# Patient Record
Sex: FEMALE | Race: WHITE | NOT HISPANIC OR LATINO | Employment: OTHER | URBAN - METROPOLITAN AREA
[De-identification: names, ages, dates, MRNs, and addresses within clinical notes are randomized per-mention and may not be internally consistent; named-entity substitution may affect disease eponyms.]

---

## 2017-03-27 ENCOUNTER — HISTORICAL (OUTPATIENT)
Dept: RADIOLOGY | Facility: HOSPITAL | Age: 72
End: 2017-03-27

## 2017-05-25 ENCOUNTER — HISTORICAL (OUTPATIENT)
Dept: RADIOLOGY | Facility: HOSPITAL | Age: 72
End: 2017-05-25

## 2017-06-12 ENCOUNTER — HISTORICAL (OUTPATIENT)
Dept: PREADMISSION TESTING | Facility: HOSPITAL | Age: 72
End: 2017-06-12

## 2017-06-12 ENCOUNTER — HISTORICAL (OUTPATIENT)
Dept: LAB | Facility: HOSPITAL | Age: 72
End: 2017-06-12

## 2017-06-12 LAB
ABS NEUT (OLG): 5.87 X10(3)/MCL (ref 2.1–9.2)
ALBUMIN SERPL-MCNC: 3.6 GM/DL (ref 3.4–5)
ALBUMIN/GLOB SERPL: 1.2 RATIO (ref 1.1–2)
ALP SERPL-CCNC: 54 UNIT/L (ref 38–126)
ALT SERPL-CCNC: 17 UNIT/L (ref 12–78)
APTT PPP: 28.6 SECOND(S) (ref 20.6–36)
AST SERPL-CCNC: 11 UNIT/L (ref 15–37)
BASOPHILS # BLD AUTO: 0.1 X10(3)/MCL (ref 0–0.2)
BASOPHILS NFR BLD AUTO: 1 %
BILIRUB SERPL-MCNC: 0.8 MG/DL (ref 0.2–1)
BILIRUBIN DIRECT+TOT PNL SERPL-MCNC: 0 MG/DL (ref 0–0.5)
BILIRUBIN DIRECT+TOT PNL SERPL-MCNC: 0.8 MG/DL (ref 0–0.8)
BUN SERPL-MCNC: 8 MG/DL (ref 7–18)
CALCIUM SERPL-MCNC: 8.5 MG/DL (ref 8.5–10.1)
CHLORIDE SERPL-SCNC: 110 MMOL/L (ref 98–107)
CO2 SERPL-SCNC: 25 MMOL/L (ref 21–32)
CREAT SERPL-MCNC: 0.77 MG/DL (ref 0.55–1.02)
EOSINOPHIL # BLD AUTO: 0.1 X10(3)/MCL (ref 0–0.9)
EOSINOPHIL NFR BLD AUTO: 2 %
ERYTHROCYTE [DISTWIDTH] IN BLOOD BY AUTOMATED COUNT: 14.8 % (ref 11.5–17)
GLOBULIN SER-MCNC: 2.9 GM/DL (ref 2.4–3.5)
GLUCOSE SERPL-MCNC: 84 MG/DL (ref 74–106)
HCT VFR BLD AUTO: 35.1 % (ref 37–47)
HGB BLD-MCNC: 11.2 GM/DL (ref 12–16)
INR PPP: 1.04 (ref 0–1.27)
LYMPHOCYTES # BLD AUTO: 1.1 X10(3)/MCL (ref 0.6–4.6)
LYMPHOCYTES NFR BLD AUTO: 14 %
MCH RBC QN AUTO: 29.4 PG (ref 27–31)
MCHC RBC AUTO-ENTMCNC: 31.9 GM/DL (ref 33–36)
MCV RBC AUTO: 92.1 FL (ref 80–94)
MONOCYTES # BLD AUTO: 0.5 X10(3)/MCL (ref 0.1–1.3)
MONOCYTES NFR BLD AUTO: 7 %
NEUTROPHILS # BLD AUTO: 5.87 X10(3)/MCL (ref 1.4–7.9)
NEUTROPHILS NFR BLD AUTO: 76 %
PLATELET # BLD AUTO: 226 X10(3)/MCL (ref 130–400)
PMV BLD AUTO: 11.8 FL (ref 9.4–12.4)
POTASSIUM SERPL-SCNC: 4.3 MMOL/L (ref 3.5–5.1)
PROT SERPL-MCNC: 6.5 GM/DL (ref 6.4–8.2)
PROTHROMBIN TIME: 13.4 SECOND(S) (ref 12.1–14.2)
RBC # BLD AUTO: 3.81 X10(6)/MCL (ref 4.2–5.4)
SODIUM SERPL-SCNC: 147 MMOL/L (ref 136–145)
WBC # SPEC AUTO: 7.7 X10(3)/MCL (ref 4.5–11.5)

## 2017-06-15 ENCOUNTER — HISTORICAL (OUTPATIENT)
Dept: LAB | Facility: HOSPITAL | Age: 72
End: 2017-06-15

## 2017-06-15 ENCOUNTER — HISTORICAL (OUTPATIENT)
Dept: SURGERY | Facility: HOSPITAL | Age: 72
End: 2017-06-15

## 2017-07-31 ENCOUNTER — HISTORICAL (OUTPATIENT)
Dept: RADIOLOGY | Facility: HOSPITAL | Age: 72
End: 2017-07-31

## 2017-08-03 ENCOUNTER — HISTORICAL (OUTPATIENT)
Dept: RADIOLOGY | Facility: HOSPITAL | Age: 72
End: 2017-08-03

## 2017-08-04 ENCOUNTER — HISTORICAL (OUTPATIENT)
Dept: ANESTHESIOLOGY | Facility: HOSPITAL | Age: 72
End: 2017-08-04

## 2018-05-29 ENCOUNTER — HISTORICAL (OUTPATIENT)
Dept: RADIOLOGY | Facility: HOSPITAL | Age: 73
End: 2018-05-29

## 2018-07-06 ENCOUNTER — HISTORICAL (OUTPATIENT)
Dept: RADIOLOGY | Facility: HOSPITAL | Age: 73
End: 2018-07-06

## 2019-07-01 ENCOUNTER — HISTORICAL (OUTPATIENT)
Dept: RADIOLOGY | Facility: HOSPITAL | Age: 74
End: 2019-07-01

## 2019-07-24 ENCOUNTER — HISTORICAL (OUTPATIENT)
Dept: RADIOLOGY | Facility: HOSPITAL | Age: 74
End: 2019-07-24

## 2020-09-01 ENCOUNTER — HISTORICAL (OUTPATIENT)
Dept: RADIOLOGY | Facility: HOSPITAL | Age: 75
End: 2020-09-01

## 2020-09-01 LAB — BCS RECOMMENDATION EXT: NORMAL

## 2021-05-05 ENCOUNTER — HISTORICAL (OUTPATIENT)
Dept: LAB | Facility: HOSPITAL | Age: 76
End: 2021-05-05

## 2021-05-05 LAB
ABS NEUT (OLG): 6.1 X10(3)/MCL (ref 1.5–6.9)
BUN SERPL-MCNC: 16 MG/DL (ref 9.8–20.1)
CALCIUM SERPL-MCNC: 9.7 MG/DL (ref 8.4–10.2)
CHLORIDE SERPL-SCNC: 108 MMOL/L (ref 98–107)
CO2 SERPL-SCNC: 23 MMOL/L (ref 23–31)
CREAT SERPL-MCNC: 1.07 MG/DL (ref 0.55–1.02)
CREAT/UREA NIT SERPL: 15
ERYTHROCYTE [DISTWIDTH] IN BLOOD BY AUTOMATED COUNT: 14 % (ref 11.5–17)
GLUCOSE SERPL-MCNC: 113 MG/DL (ref 82–115)
HCT VFR BLD AUTO: 39.2 % (ref 36–48)
HGB BLD-MCNC: 12.9 GM/DL (ref 12–16)
MCH RBC QN AUTO: 30 PG (ref 27–34)
MCHC RBC AUTO-ENTMCNC: 33 GM/DL (ref 31–36)
MCV RBC AUTO: 91 FL (ref 80–99)
PLATELET # BLD AUTO: 320 X10(3)/MCL (ref 140–400)
PMV BLD AUTO: 11.5 FL (ref 6.8–10)
POTASSIUM SERPL-SCNC: 4 MMOL/L (ref 3.5–5.1)
RBC # BLD AUTO: 4.32 X10(6)/MCL (ref 4.2–5.4)
SODIUM SERPL-SCNC: 141 MMOL/L (ref 136–145)
WBC # SPEC AUTO: 9.1 X10(3)/MCL (ref 4.5–11.5)

## 2021-05-11 ENCOUNTER — HOSPITAL ENCOUNTER (OUTPATIENT)
Dept: MEDSURG UNIT | Facility: HOSPITAL | Age: 76
End: 2021-05-12
Attending: INTERNAL MEDICINE | Admitting: INTERNAL MEDICINE

## 2021-05-12 LAB
ABS NEUT (OLG): 4.9 X10(3)/MCL (ref 2.1–9.2)
ALBUMIN SERPL-MCNC: 2.9 GM/DL (ref 3.4–4.8)
ALBUMIN/GLOB SERPL: 1 RATIO (ref 1.1–2)
ALP SERPL-CCNC: 53 UNIT/L (ref 40–150)
ALT SERPL-CCNC: 9 UNIT/L (ref 0–55)
AST SERPL-CCNC: 28 UNIT/L (ref 5–34)
BASOPHILS # BLD AUTO: 0.1 X10(3)/MCL (ref 0–0.2)
BASOPHILS NFR BLD AUTO: 1 %
BILIRUB SERPL-MCNC: 0.5 MG/DL
BILIRUBIN DIRECT+TOT PNL SERPL-MCNC: 0.2 MG/DL (ref 0–0.5)
BILIRUBIN DIRECT+TOT PNL SERPL-MCNC: 0.3 MG/DL (ref 0–0.8)
BUN SERPL-MCNC: 10.6 MG/DL (ref 9.8–20.1)
CALCIUM SERPL-MCNC: 8.5 MG/DL (ref 8.4–10.2)
CHLORIDE SERPL-SCNC: 108 MMOL/L (ref 98–107)
CO2 SERPL-SCNC: 23 MMOL/L (ref 23–31)
CREAT SERPL-MCNC: 0.82 MG/DL (ref 0.55–1.02)
EOSINOPHIL # BLD AUTO: 0.1 X10(3)/MCL (ref 0–0.9)
EOSINOPHIL NFR BLD AUTO: 2 %
ERYTHROCYTE [DISTWIDTH] IN BLOOD BY AUTOMATED COUNT: 14.7 % (ref 11.5–17)
GLOBULIN SER-MCNC: 2.8 GM/DL (ref 2.4–3.5)
GLUCOSE SERPL-MCNC: 96 MG/DL (ref 82–115)
HCT VFR BLD AUTO: 34.6 % (ref 37–47)
HGB BLD-MCNC: 11.2 GM/DL (ref 12–16)
LYMPHOCYTES # BLD AUTO: 1.7 X10(3)/MCL (ref 0.6–4.6)
LYMPHOCYTES NFR BLD AUTO: 23 %
MCH RBC QN AUTO: 30.4 PG (ref 27–31)
MCHC RBC AUTO-ENTMCNC: 32.4 GM/DL (ref 33–36)
MCV RBC AUTO: 93.8 FL (ref 80–94)
MONOCYTES # BLD AUTO: 0.6 X10(3)/MCL (ref 0.1–1.3)
MONOCYTES NFR BLD AUTO: 8 %
NEUTROPHILS # BLD AUTO: 4.9 X10(3)/MCL (ref 2.1–9.2)
NEUTROPHILS NFR BLD AUTO: 67 %
PLATELET # BLD AUTO: 236 X10(3)/MCL (ref 130–400)
PMV BLD AUTO: 11.5 FL (ref 9.4–12.4)
POTASSIUM SERPL-SCNC: 4.5 MMOL/L (ref 3.5–5.1)
PROT SERPL-MCNC: 5.7 GM/DL (ref 5.8–7.6)
RBC # BLD AUTO: 3.69 X10(6)/MCL (ref 4.2–5.4)
SODIUM SERPL-SCNC: 140 MMOL/L (ref 136–145)
WBC # SPEC AUTO: 7.3 X10(3)/MCL (ref 4.5–11.5)

## 2021-09-29 ENCOUNTER — HISTORICAL (OUTPATIENT)
Dept: RADIOLOGY | Facility: HOSPITAL | Age: 76
End: 2021-09-29

## 2021-10-05 ENCOUNTER — HISTORICAL (OUTPATIENT)
Dept: RADIOLOGY | Facility: HOSPITAL | Age: 76
End: 2021-10-05

## 2022-04-11 ENCOUNTER — HISTORICAL (OUTPATIENT)
Dept: ADMINISTRATIVE | Facility: HOSPITAL | Age: 77
End: 2022-04-11
Payer: MEDICARE

## 2022-04-27 VITALS
HEIGHT: 66 IN | BODY MASS INDEX: 36.64 KG/M2 | SYSTOLIC BLOOD PRESSURE: 138 MMHG | WEIGHT: 228 LBS | OXYGEN SATURATION: 97 % | DIASTOLIC BLOOD PRESSURE: 88 MMHG

## 2022-04-29 NOTE — OP NOTE
DATE OF SURGERY:    06/15/2017    SURGEON:  Breezy Stubbs MD    PREOPERATIVE DIAGNOSIS:  Chronic recurrent pansinusitis, intranasal and intrasinus polyposis, nasal septal deviation, turbinate hypertrophy.    POSTOPERATIVE DIAGNOSIS:  Chronic recurrent pansinusitis, intranasal and intrasinus polyposis, nasal septal deviation, turbinate hypertrophy    PROCEDURES:  Revision endoscopic sinus surgery, placement of right drug-eluting stent in the frontal ethmoid recess and cultures.    PROCEDURE IN DETAIL:  With proper consent and information, the patient was brought to the operating room and placed on the operating table in the supine position.  After satisfactory endotracheal intubation and general anesthesia, the patient was placed asleep.  The nose was packed with 200 mg of cocaine, regionally anesthetized with 1 mL of 2% Xylocaine with epinephrine.  Bilateral posterior ethmoidectomies were done endoscopically the frontal ethmoid recess.  The right frontal sinus, as well as the left was severely stenotic.  This was opened with upbiting cutting forceps.  The right was much more involved with the left maxillary antrostomy revisions were done and the Propel stent was placed in the right frontal ethmoid recess.  The patient tolerated the procedure well.  She was transferred back to the recovery room awake, alert and responsive.        ______________________________  MD MARIAELENA Patel/CD  DD:  06/19/2017  Time:  01:43PM  DT:  06/20/2017  Time:  08:17AM  Job #:  [id number]

## 2022-06-28 ENCOUNTER — HOSPITAL ENCOUNTER (EMERGENCY)
Facility: HOSPITAL | Age: 77
Discharge: STILL ADMITTED OR ADMINISTRATIVE | End: 2022-06-29
Attending: STUDENT IN AN ORGANIZED HEALTH CARE EDUCATION/TRAINING PROGRAM
Payer: MEDICARE

## 2022-06-28 DIAGNOSIS — R06.02 SHORTNESS OF BREATH: ICD-10-CM

## 2022-06-28 DIAGNOSIS — R09.02 HYPOXIA: ICD-10-CM

## 2022-06-28 DIAGNOSIS — J45.901 EXACERBATION OF ASTHMA, UNSPECIFIED ASTHMA SEVERITY, UNSPECIFIED WHETHER PERSISTENT: Primary | ICD-10-CM

## 2022-06-28 LAB
ALBUMIN SERPL-MCNC: 3.1 GM/DL (ref 3.4–4.8)
ALBUMIN/GLOB SERPL: 0.9 RATIO (ref 1.1–2)
ALP SERPL-CCNC: 37 UNIT/L (ref 40–150)
ALT SERPL-CCNC: 7 UNIT/L (ref 0–55)
AST SERPL-CCNC: 12 UNIT/L (ref 5–34)
BASOPHILS # BLD AUTO: 0.12 X10(3)/MCL (ref 0–0.2)
BASOPHILS NFR BLD AUTO: 1.4 %
BILIRUBIN DIRECT+TOT PNL SERPL-MCNC: 0.4 MG/DL
BUN SERPL-MCNC: 12 MG/DL (ref 9.8–20.1)
CALCIUM SERPL-MCNC: 9.5 MG/DL (ref 8.4–10.2)
CHLORIDE SERPL-SCNC: 105 MMOL/L (ref 98–107)
CO2 SERPL-SCNC: 23 MMOL/L (ref 23–31)
CREAT SERPL-MCNC: 0.87 MG/DL (ref 0.55–1.02)
EOSINOPHIL # BLD AUTO: 0.36 X10(3)/MCL (ref 0–0.9)
EOSINOPHIL NFR BLD AUTO: 4.3 %
ERYTHROCYTE [DISTWIDTH] IN BLOOD BY AUTOMATED COUNT: 14.4 % (ref 11.5–17)
FLUAV AG UPPER RESP QL IA.RAPID: NOT DETECTED
FLUBV AG UPPER RESP QL IA.RAPID: NOT DETECTED
GLOBULIN SER-MCNC: 3.6 GM/DL (ref 2.4–3.5)
GLUCOSE SERPL-MCNC: 113 MG/DL (ref 82–115)
HCT VFR BLD AUTO: 33.8 % (ref 37–47)
HGB BLD-MCNC: 11 GM/DL (ref 12–16)
IMM GRANULOCYTES # BLD AUTO: 0.03 X10(3)/MCL (ref 0–0.02)
IMM GRANULOCYTES NFR BLD AUTO: 0.4 % (ref 0–0.43)
LYMPHOCYTES # BLD AUTO: 1.73 X10(3)/MCL (ref 0.6–4.6)
LYMPHOCYTES NFR BLD AUTO: 20.5 %
MCH RBC QN AUTO: 30.1 PG (ref 27–31)
MCHC RBC AUTO-ENTMCNC: 32.5 MG/DL (ref 33–36)
MCV RBC AUTO: 92.6 FL (ref 80–94)
MONOCYTES # BLD AUTO: 0.54 X10(3)/MCL (ref 0.1–1.3)
MONOCYTES NFR BLD AUTO: 6.4 %
NEUTROPHILS # BLD AUTO: 5.7 X10(3)/MCL (ref 2.1–9.2)
NEUTROPHILS NFR BLD AUTO: 67 %
PLATELET # BLD AUTO: 255 X10(3)/MCL (ref 130–400)
PMV BLD AUTO: 10.6 FL (ref 9.4–12.4)
POTASSIUM SERPL-SCNC: 4.3 MMOL/L (ref 3.5–5.1)
PROT SERPL-MCNC: 6.7 GM/DL (ref 5.8–7.6)
RBC # BLD AUTO: 3.65 X10(6)/MCL (ref 4.2–5.4)
SARS-COV-2 RNA RESP QL NAA+PROBE: NOT DETECTED
SODIUM SERPL-SCNC: 140 MMOL/L (ref 136–145)
TROPONIN I SERPL-MCNC: <0.01 NG/ML (ref 0–0.04)
WBC # SPEC AUTO: 8.4 X10(3)/MCL (ref 4.5–11.5)

## 2022-06-28 PROCEDURE — 94640 AIRWAY INHALATION TREATMENT: CPT | Mod: XB

## 2022-06-28 PROCEDURE — 93005 ELECTROCARDIOGRAM TRACING: CPT

## 2022-06-28 PROCEDURE — 63600175 PHARM REV CODE 636 W HCPCS: Performed by: STUDENT IN AN ORGANIZED HEALTH CARE EDUCATION/TRAINING PROGRAM

## 2022-06-28 PROCEDURE — 84484 ASSAY OF TROPONIN QUANT: CPT | Performed by: STUDENT IN AN ORGANIZED HEALTH CARE EDUCATION/TRAINING PROGRAM

## 2022-06-28 PROCEDURE — 99285 EMERGENCY DEPT VISIT HI MDM: CPT | Mod: 25

## 2022-06-28 PROCEDURE — 25000242 PHARM REV CODE 250 ALT 637 W/ HCPCS: Performed by: STUDENT IN AN ORGANIZED HEALTH CARE EDUCATION/TRAINING PROGRAM

## 2022-06-28 PROCEDURE — 36415 COLL VENOUS BLD VENIPUNCTURE: CPT | Performed by: STUDENT IN AN ORGANIZED HEALTH CARE EDUCATION/TRAINING PROGRAM

## 2022-06-28 PROCEDURE — 87636 SARSCOV2 & INF A&B AMP PRB: CPT | Performed by: STUDENT IN AN ORGANIZED HEALTH CARE EDUCATION/TRAINING PROGRAM

## 2022-06-28 PROCEDURE — 94761 N-INVAS EAR/PLS OXIMETRY MLT: CPT

## 2022-06-28 PROCEDURE — 80053 COMPREHEN METABOLIC PANEL: CPT | Performed by: STUDENT IN AN ORGANIZED HEALTH CARE EDUCATION/TRAINING PROGRAM

## 2022-06-28 PROCEDURE — 85025 COMPLETE CBC W/AUTO DIFF WBC: CPT | Performed by: STUDENT IN AN ORGANIZED HEALTH CARE EDUCATION/TRAINING PROGRAM

## 2022-06-28 RX ORDER — IPRATROPIUM BROMIDE AND ALBUTEROL SULFATE 2.5; .5 MG/3ML; MG/3ML
3 SOLUTION RESPIRATORY (INHALATION)
Status: COMPLETED | OUTPATIENT
Start: 2022-06-28 | End: 2022-06-28

## 2022-06-28 RX ORDER — PREDNISONE 20 MG/1
40 TABLET ORAL
Status: COMPLETED | OUTPATIENT
Start: 2022-06-28 | End: 2022-06-28

## 2022-06-28 RX ORDER — IPRATROPIUM BROMIDE AND ALBUTEROL SULFATE 2.5; .5 MG/3ML; MG/3ML
3 SOLUTION RESPIRATORY (INHALATION) EVERY 4 HOURS
Status: DISCONTINUED | OUTPATIENT
Start: 2022-06-28 | End: 2022-06-29 | Stop reason: HOSPADM

## 2022-06-28 RX ORDER — BUDESONIDE 0.5 MG/2ML
0.5 INHALANT ORAL EVERY 12 HOURS
Status: DISCONTINUED | OUTPATIENT
Start: 2022-06-28 | End: 2022-06-29 | Stop reason: HOSPADM

## 2022-06-28 RX ADMIN — PREDNISONE 40 MG: 20 TABLET ORAL at 09:06

## 2022-06-28 RX ADMIN — IPRATROPIUM BROMIDE AND ALBUTEROL SULFATE 3 ML: 2.5; .5 SOLUTION RESPIRATORY (INHALATION) at 10:06

## 2022-06-28 RX ADMIN — IPRATROPIUM BROMIDE AND ALBUTEROL SULFATE 3 ML: 2.5; .5 SOLUTION RESPIRATORY (INHALATION) at 11:06

## 2022-06-28 RX ADMIN — IPRATROPIUM BROMIDE AND ALBUTEROL SULFATE 3 ML: 2.5; .5 SOLUTION RESPIRATORY (INHALATION) at 09:06

## 2022-06-28 RX ADMIN — IPRATROPIUM BROMIDE AND ALBUTEROL SULFATE 3 ML: 2.5; .5 SOLUTION RESPIRATORY (INHALATION) at 08:06

## 2022-06-28 RX ADMIN — BUDESONIDE INHALATION 0.5 MG: 0.5 SUSPENSION RESPIRATORY (INHALATION) at 11:06

## 2022-06-29 ENCOUNTER — HOSPITAL ENCOUNTER (INPATIENT)
Facility: HOSPITAL | Age: 77
LOS: 1 days | Discharge: HOME HEALTH | DRG: 203 | End: 2022-07-01
Attending: STUDENT IN AN ORGANIZED HEALTH CARE EDUCATION/TRAINING PROGRAM | Admitting: INTERNAL MEDICINE
Payer: MEDICARE

## 2022-06-29 VITALS
SYSTOLIC BLOOD PRESSURE: 124 MMHG | RESPIRATION RATE: 19 BRPM | HEART RATE: 106 BPM | TEMPERATURE: 98 F | BODY MASS INDEX: 32.78 KG/M2 | HEIGHT: 66 IN | DIASTOLIC BLOOD PRESSURE: 68 MMHG | WEIGHT: 204 LBS | OXYGEN SATURATION: 95 %

## 2022-06-29 DIAGNOSIS — J45.41 MODERATE PERSISTENT ASTHMA WITH EXACERBATION: ICD-10-CM

## 2022-06-29 DIAGNOSIS — R09.02 HYPOXIA: Primary | ICD-10-CM

## 2022-06-29 DIAGNOSIS — J45.901 ASTHMA EXACERBATION: ICD-10-CM

## 2022-06-29 LAB
BASOPHILS # BLD AUTO: 0.04 X10(3)/MCL (ref 0–0.2)
BASOPHILS NFR BLD AUTO: 0.3 %
EOSINOPHIL # BLD AUTO: 0.02 X10(3)/MCL (ref 0–0.9)
EOSINOPHIL NFR BLD AUTO: 0.2 %
ERYTHROCYTE [DISTWIDTH] IN BLOOD BY AUTOMATED COUNT: 14.7 % (ref 11.5–17)
EST. AVERAGE GLUCOSE BLD GHB EST-MCNC: 99.7 MG/DL
HBA1C MFR BLD: 5.1 %
HCT VFR BLD AUTO: 35.2 % (ref 37–47)
HGB BLD-MCNC: 11.4 GM/DL (ref 12–16)
IMM GRANULOCYTES # BLD AUTO: 0.05 X10(3)/MCL (ref 0–0.02)
IMM GRANULOCYTES NFR BLD AUTO: 0.4 % (ref 0–0.43)
LYMPHOCYTES # BLD AUTO: 1.76 X10(3)/MCL (ref 0.6–4.6)
LYMPHOCYTES NFR BLD AUTO: 14.1 %
MCH RBC QN AUTO: 29.4 PG (ref 27–31)
MCHC RBC AUTO-ENTMCNC: 32.4 MG/DL (ref 33–36)
MCV RBC AUTO: 90.7 FL (ref 80–94)
MONOCYTES # BLD AUTO: 0.7 X10(3)/MCL (ref 0.1–1.3)
MONOCYTES NFR BLD AUTO: 5.6 %
NEUTROPHILS # BLD AUTO: 9.9 X10(3)/MCL (ref 2.1–9.2)
NEUTROPHILS NFR BLD AUTO: 79.4 %
NRBC BLD AUTO-RTO: 0 %
PLATELET # BLD AUTO: 271 X10(3)/MCL (ref 130–400)
PMV BLD AUTO: 11.2 FL (ref 9.4–12.4)
RBC # BLD AUTO: 3.88 X10(6)/MCL (ref 4.2–5.4)
WBC # SPEC AUTO: 12.4 X10(3)/MCL (ref 4.5–11.5)

## 2022-06-29 PROCEDURE — 83036 HEMOGLOBIN GLYCOSYLATED A1C: CPT | Performed by: INTERNAL MEDICINE

## 2022-06-29 PROCEDURE — 94761 N-INVAS EAR/PLS OXIMETRY MLT: CPT

## 2022-06-29 PROCEDURE — 97161 PT EVAL LOW COMPLEX 20 MIN: CPT

## 2022-06-29 PROCEDURE — 94640 AIRWAY INHALATION TREATMENT: CPT | Mod: XB

## 2022-06-29 PROCEDURE — 99285 EMERGENCY DEPT VISIT HI MDM: CPT | Mod: 25

## 2022-06-29 PROCEDURE — 25000242 PHARM REV CODE 250 ALT 637 W/ HCPCS: Performed by: STUDENT IN AN ORGANIZED HEALTH CARE EDUCATION/TRAINING PROGRAM

## 2022-06-29 PROCEDURE — 63600175 PHARM REV CODE 636 W HCPCS: Performed by: INTERNAL MEDICINE

## 2022-06-29 PROCEDURE — G0378 HOSPITAL OBSERVATION PER HR: HCPCS

## 2022-06-29 PROCEDURE — 85025 COMPLETE CBC W/AUTO DIFF WBC: CPT | Performed by: INTERNAL MEDICINE

## 2022-06-29 PROCEDURE — 25000242 PHARM REV CODE 250 ALT 637 W/ HCPCS: Performed by: INTERNAL MEDICINE

## 2022-06-29 PROCEDURE — 99900031 HC PATIENT EDUCATION (STAT)

## 2022-06-29 PROCEDURE — 25000003 PHARM REV CODE 250: Performed by: INTERNAL MEDICINE

## 2022-06-29 PROCEDURE — 94760 N-INVAS EAR/PLS OXIMETRY 1: CPT

## 2022-06-29 PROCEDURE — 36415 COLL VENOUS BLD VENIPUNCTURE: CPT | Performed by: INTERNAL MEDICINE

## 2022-06-29 RX ORDER — PREDNISONE 20 MG/1
40 TABLET ORAL DAILY
Status: DISCONTINUED | OUTPATIENT
Start: 2022-06-29 | End: 2022-06-29

## 2022-06-29 RX ORDER — ATORVASTATIN CALCIUM 40 MG/1
40 TABLET, FILM COATED ORAL DAILY
Status: DISCONTINUED | OUTPATIENT
Start: 2022-06-29 | End: 2022-07-01 | Stop reason: HOSPADM

## 2022-06-29 RX ORDER — ISOSORBIDE MONONITRATE 60 MG/1
60 TABLET, EXTENDED RELEASE ORAL DAILY
COMMUNITY

## 2022-06-29 RX ORDER — LEVOTHYROXINE SODIUM 88 UG/1
88 TABLET ORAL
Status: ON HOLD | COMMUNITY
End: 2022-07-01 | Stop reason: HOSPADM

## 2022-06-29 RX ORDER — CIPROFLOXACIN AND DEXAMETHASONE 3; 1 MG/ML; MG/ML
4 SUSPENSION/ DROPS AURICULAR (OTIC) 2 TIMES DAILY
Status: DISCONTINUED | OUTPATIENT
Start: 2022-06-29 | End: 2022-07-01 | Stop reason: HOSPADM

## 2022-06-29 RX ORDER — GUAIFENESIN/DEXTROMETHORPHAN 100-10MG/5
10 SYRUP ORAL EVERY 6 HOURS PRN
Status: DISCONTINUED | OUTPATIENT
Start: 2022-06-29 | End: 2022-07-01 | Stop reason: HOSPADM

## 2022-06-29 RX ORDER — DULOXETIN HYDROCHLORIDE 60 MG/1
60 CAPSULE, DELAYED RELEASE ORAL DAILY
COMMUNITY
Start: 2022-04-04

## 2022-06-29 RX ORDER — MONTELUKAST SODIUM 10 MG/1
10 TABLET ORAL NIGHTLY
Status: DISCONTINUED | OUTPATIENT
Start: 2022-06-29 | End: 2022-06-29

## 2022-06-29 RX ORDER — PROMETHAZINE HYDROCHLORIDE AND CODEINE PHOSPHATE 6.25; 1 MG/5ML; MG/5ML
7.5 SOLUTION ORAL EVERY 8 HOURS PRN
Status: DISCONTINUED | OUTPATIENT
Start: 2022-06-29 | End: 2022-06-29

## 2022-06-29 RX ORDER — DOXYCYCLINE 100 MG/1
100 CAPSULE ORAL
Status: ON HOLD | COMMUNITY
End: 2022-07-01 | Stop reason: HOSPADM

## 2022-06-29 RX ORDER — SODIUM CHLORIDE 0.9 % (FLUSH) 0.9 %
10 SYRINGE (ML) INJECTION
Status: DISCONTINUED | OUTPATIENT
Start: 2022-06-29 | End: 2022-07-01 | Stop reason: HOSPADM

## 2022-06-29 RX ORDER — ASPIRIN 81 MG/1
81 TABLET ORAL DAILY
Status: DISCONTINUED | OUTPATIENT
Start: 2022-06-29 | End: 2022-07-01 | Stop reason: HOSPADM

## 2022-06-29 RX ORDER — FLUCONAZOLE 2 MG/ML
200 INJECTION, SOLUTION INTRAVENOUS
Status: DISCONTINUED | OUTPATIENT
Start: 2022-06-29 | End: 2022-07-01 | Stop reason: HOSPADM

## 2022-06-29 RX ORDER — TALC
6 POWDER (GRAM) TOPICAL NIGHTLY PRN
Status: DISCONTINUED | OUTPATIENT
Start: 2022-06-29 | End: 2022-07-01 | Stop reason: HOSPADM

## 2022-06-29 RX ORDER — MONTELUKAST SODIUM 10 MG/1
10 TABLET ORAL NIGHTLY
COMMUNITY

## 2022-06-29 RX ORDER — BUDESONIDE 0.5 MG/2ML
0.5 INHALANT ORAL DAILY
Status: DISCONTINUED | OUTPATIENT
Start: 2022-06-29 | End: 2022-07-01 | Stop reason: HOSPADM

## 2022-06-29 RX ORDER — NITROGLYCERIN 0.4 MG/1
0.4 TABLET SUBLINGUAL
COMMUNITY
Start: 2021-10-12

## 2022-06-29 RX ORDER — AMOXICILLIN 250 MG
1 CAPSULE ORAL 2 TIMES DAILY
Status: DISCONTINUED | OUTPATIENT
Start: 2022-06-29 | End: 2022-07-01 | Stop reason: HOSPADM

## 2022-06-29 RX ORDER — RIVAROXABAN 2.5 MG/1
TABLET, FILM COATED ORAL 2 TIMES DAILY WITH MEALS
Status: ON HOLD | COMMUNITY
End: 2022-07-01 | Stop reason: HOSPADM

## 2022-06-29 RX ORDER — DOXYLAMINE SUCCINATE 25 MG
TABLET ORAL 2 TIMES DAILY
Status: DISCONTINUED | OUTPATIENT
Start: 2022-06-29 | End: 2022-07-01 | Stop reason: HOSPADM

## 2022-06-29 RX ORDER — DOXYCYCLINE HYCLATE 100 MG
100 TABLET ORAL EVERY 12 HOURS
Status: DISCONTINUED | OUTPATIENT
Start: 2022-06-29 | End: 2022-06-29

## 2022-06-29 RX ORDER — GUAIFENESIN 600 MG/1
1200 TABLET, EXTENDED RELEASE ORAL 2 TIMES DAILY
Status: DISCONTINUED | OUTPATIENT
Start: 2022-06-29 | End: 2022-07-01 | Stop reason: HOSPADM

## 2022-06-29 RX ORDER — MONTELUKAST SODIUM 10 MG/1
10 TABLET ORAL NIGHTLY
Status: DISCONTINUED | OUTPATIENT
Start: 2022-06-29 | End: 2022-07-01 | Stop reason: HOSPADM

## 2022-06-29 RX ORDER — FAMOTIDINE 20 MG/1
20 TABLET, FILM COATED ORAL DAILY
Status: DISCONTINUED | OUTPATIENT
Start: 2022-06-29 | End: 2022-07-01 | Stop reason: HOSPADM

## 2022-06-29 RX ORDER — SODIUM CHLORIDE 0.9 % (FLUSH) 0.9 %
10 SYRINGE (ML) INJECTION
Status: DISCONTINUED | OUTPATIENT
Start: 2022-06-29 | End: 2022-06-29

## 2022-06-29 RX ORDER — BENZONATATE 100 MG/1
100 CAPSULE ORAL 3 TIMES DAILY PRN
Status: DISCONTINUED | OUTPATIENT
Start: 2022-06-29 | End: 2022-07-01 | Stop reason: HOSPADM

## 2022-06-29 RX ORDER — ACETAMINOPHEN 325 MG/1
650 TABLET ORAL EVERY 4 HOURS PRN
Status: DISCONTINUED | OUTPATIENT
Start: 2022-06-29 | End: 2022-07-01 | Stop reason: HOSPADM

## 2022-06-29 RX ORDER — FLUTICASONE PROPIONATE 50 MCG
SPRAY, SUSPENSION (ML) NASAL
COMMUNITY

## 2022-06-29 RX ORDER — FLUTICASONE FUROATE AND VILANTEROL 200; 25 UG/1; UG/1
1 POWDER RESPIRATORY (INHALATION)
COMMUNITY
Start: 2021-10-12 | End: 2022-06-29 | Stop reason: CLARIF

## 2022-06-29 RX ORDER — IPRATROPIUM BROMIDE AND ALBUTEROL SULFATE 2.5; .5 MG/3ML; MG/3ML
3 SOLUTION RESPIRATORY (INHALATION) EVERY 6 HOURS PRN
Status: DISCONTINUED | OUTPATIENT
Start: 2022-06-29 | End: 2022-07-01 | Stop reason: HOSPADM

## 2022-06-29 RX ORDER — ALBUTEROL SULFATE 0.83 MG/ML
2.5 SOLUTION RESPIRATORY (INHALATION) EVERY 6 HOURS PRN
COMMUNITY

## 2022-06-29 RX ORDER — RIVAROXABAN 2.5 MG/1
1 TABLET, FILM COATED ORAL 2 TIMES DAILY
COMMUNITY
Start: 2022-06-06

## 2022-06-29 RX ORDER — DULOXETIN HYDROCHLORIDE 60 MG/1
60 CAPSULE, DELAYED RELEASE ORAL DAILY
Status: DISCONTINUED | OUTPATIENT
Start: 2022-06-29 | End: 2022-07-01 | Stop reason: HOSPADM

## 2022-06-29 RX ORDER — FLUTICASONE PROPIONATE 50 MCG
1 SPRAY, SUSPENSION (ML) NASAL DAILY
Status: DISCONTINUED | OUTPATIENT
Start: 2022-06-29 | End: 2022-07-01 | Stop reason: HOSPADM

## 2022-06-29 RX ORDER — DIAZEPAM 10 MG/2ML
5 INJECTION INTRAMUSCULAR EVERY 6 HOURS PRN
Status: DISCONTINUED | OUTPATIENT
Start: 2022-06-29 | End: 2022-07-01 | Stop reason: HOSPADM

## 2022-06-29 RX ORDER — AZELASTINE 1 MG/ML
SPRAY, METERED NASAL
COMMUNITY

## 2022-06-29 RX ORDER — METOPROLOL SUCCINATE 25 MG/1
25 TABLET, EXTENDED RELEASE ORAL DAILY
COMMUNITY

## 2022-06-29 RX ORDER — METOPROLOL SUCCINATE 25 MG/1
25 TABLET, EXTENDED RELEASE ORAL DAILY
Status: DISCONTINUED | OUTPATIENT
Start: 2022-06-29 | End: 2022-07-01 | Stop reason: HOSPADM

## 2022-06-29 RX ORDER — ALBUTEROL SULFATE 0.83 MG/ML
2.5 SOLUTION RESPIRATORY (INHALATION) EVERY 4 HOURS
Status: DISCONTINUED | OUTPATIENT
Start: 2022-06-29 | End: 2022-06-29

## 2022-06-29 RX ORDER — LEVOTHYROXINE SODIUM 88 UG/1
88 TABLET ORAL
Status: DISCONTINUED | OUTPATIENT
Start: 2022-06-29 | End: 2022-07-01 | Stop reason: HOSPADM

## 2022-06-29 RX ORDER — CETIRIZINE HYDROCHLORIDE 10 MG/1
10 TABLET ORAL DAILY
COMMUNITY

## 2022-06-29 RX ORDER — LEVOTHYROXINE SODIUM 125 UG/1
TABLET ORAL
Status: ON HOLD | COMMUNITY
End: 2022-06-29 | Stop reason: CLARIF

## 2022-06-29 RX ORDER — ISOSORBIDE MONONITRATE 30 MG/1
60 TABLET, EXTENDED RELEASE ORAL DAILY
Status: DISCONTINUED | OUTPATIENT
Start: 2022-06-29 | End: 2022-07-01 | Stop reason: HOSPADM

## 2022-06-29 RX ORDER — BECLOMETHASONE DIPROPIONATE MONOHYDRATE 42 UG/1
2 SPRAY, SUSPENSION NASAL
COMMUNITY
Start: 2022-01-31

## 2022-06-29 RX ORDER — ONDANSETRON 2 MG/ML
4 INJECTION INTRAMUSCULAR; INTRAVENOUS EVERY 8 HOURS PRN
Status: DISCONTINUED | OUTPATIENT
Start: 2022-06-29 | End: 2022-07-01 | Stop reason: HOSPADM

## 2022-06-29 RX ORDER — ROSUVASTATIN CALCIUM 20 MG/1
20 TABLET, COATED ORAL DAILY
COMMUNITY

## 2022-06-29 RX ADMIN — SENNOSIDES AND DOCUSATE SODIUM 1 TABLET: 50; 8.6 TABLET ORAL at 11:06

## 2022-06-29 RX ADMIN — MELATONIN TAB 3 MG 6 MG: 3 TAB at 11:06

## 2022-06-29 RX ADMIN — GUAIFENESIN 1200 MG: 600 TABLET, EXTENDED RELEASE ORAL at 04:06

## 2022-06-29 RX ADMIN — CEFEPIME 1 G: 1 INJECTION, POWDER, FOR SOLUTION INTRAMUSCULAR; INTRAVENOUS at 07:06

## 2022-06-29 RX ADMIN — METHYLPREDNISOLONE SODIUM SUCCINATE 60 MG: 40 INJECTION, POWDER, FOR SOLUTION INTRAMUSCULAR; INTRAVENOUS at 11:06

## 2022-06-29 RX ADMIN — FLUTICASONE PROPIONATE 50 MCG: 50 SPRAY, METERED NASAL at 10:06

## 2022-06-29 RX ADMIN — METOPROLOL SUCCINATE 25 MG: 25 TABLET, EXTENDED RELEASE ORAL at 11:06

## 2022-06-29 RX ADMIN — BUDESONIDE 0.5 MG: 0.5 INHALANT ORAL at 12:06

## 2022-06-29 RX ADMIN — MONTELUKAST SODIUM 10 MG: 10 TABLET, COATED ORAL at 08:06

## 2022-06-29 RX ADMIN — MICONAZOLE NITRATE: 20 CREAM TOPICAL at 03:06

## 2022-06-29 RX ADMIN — IPRATROPIUM BROMIDE AND ALBUTEROL SULFATE 3 ML: 2.5; .5 SOLUTION RESPIRATORY (INHALATION) at 12:06

## 2022-06-29 RX ADMIN — CIPROFLOXACIN AND DEXAMETHASONE 4 DROP: 3; 1 SUSPENSION/ DROPS AURICULAR (OTIC) at 03:06

## 2022-06-29 RX ADMIN — GUAIFENESIN 1200 MG: 600 TABLET, EXTENDED RELEASE ORAL at 08:06

## 2022-06-29 RX ADMIN — DULOXETINE HYDROCHLORIDE 60 MG: 60 CAPSULE, DELAYED RELEASE ORAL at 10:06

## 2022-06-29 RX ADMIN — FAMOTIDINE 20 MG: 20 TABLET ORAL at 11:06

## 2022-06-29 RX ADMIN — ATORVASTATIN CALCIUM 40 MG: 40 TABLET, FILM COATED ORAL at 11:06

## 2022-06-29 RX ADMIN — MICONAZOLE NITRATE: 20 CREAM TOPICAL at 08:06

## 2022-06-29 RX ADMIN — LEVOTHYROXINE SODIUM 88 MCG: 88 TABLET ORAL at 05:06

## 2022-06-29 RX ADMIN — FLUCONAZOLE, SODIUM CHLORIDE 200 MG: 2 INJECTION INTRAVENOUS at 03:06

## 2022-06-29 RX ADMIN — RIVAROXABAN 2.5 MG: 2.5 TABLET, FILM COATED ORAL at 08:06

## 2022-06-29 RX ADMIN — ISOSORBIDE MONONITRATE 60 MG: 30 TABLET, EXTENDED RELEASE ORAL at 10:06

## 2022-06-29 RX ADMIN — METHYLPREDNISOLONE SODIUM SUCCINATE 60 MG: 40 INJECTION, POWDER, FOR SOLUTION INTRAMUSCULAR; INTRAVENOUS at 08:06

## 2022-06-29 RX ADMIN — RIVAROXABAN 2.5 MG: 2.5 TABLET, FILM COATED ORAL at 10:06

## 2022-06-29 RX ADMIN — ALBUTEROL SULFATE 2.5 MG: 2.5 SOLUTION RESPIRATORY (INHALATION) at 03:06

## 2022-06-29 RX ADMIN — CIPROFLOXACIN AND DEXAMETHASONE 4 DROP: 3; 1 SUSPENSION/ DROPS AURICULAR (OTIC) at 08:06

## 2022-06-29 RX ADMIN — ASPIRIN 81 MG: 81 TABLET, COATED ORAL at 11:06

## 2022-06-29 RX ADMIN — SENNOSIDES AND DOCUSATE SODIUM 1 TABLET: 50; 8.6 TABLET ORAL at 08:06

## 2022-06-29 RX ADMIN — IPRATROPIUM BROMIDE AND ALBUTEROL SULFATE 3 ML: 2.5; .5 SOLUTION RESPIRATORY (INHALATION) at 09:06

## 2022-06-29 RX ADMIN — CEFEPIME 1 G: 1 INJECTION, POWDER, FOR SOLUTION INTRAMUSCULAR; INTRAVENOUS at 11:06

## 2022-06-29 NOTE — PLAN OF CARE
06/29/22 1008   Discharge Assessment   Assessment Type Discharge Planning Assessment   Confirmed/corrected address, phone number and insurance Yes   Confirmed Demographics Correct on Facesheet   Source of Information patient;family   When was your last doctors appointment? 06/06/22   Reason For Admission Asthma Exacerbation   Lives With alone   Facility Arrived From: Home   Do you expect to return to your current living situation? Yes   Prior to hospitilization cognitive status: Alert/Oriented   Current cognitive status: Alert/Oriented   Walking or Climbing Stairs Difficulty ambulation difficulty, requires equipment   Mobility Management Rolling walker- Occasionally   Dressing/Bathing Difficulty none   Home Accessibility wheelchair accessible   Home Layout Able to live on 1st floor   Equipment Currently Used at Home bedside commode;raised toilet;shower chair;walker, rolling;grab bar;wheelchair;rollator   Readmission within 30 days? No   Patient currently being followed by outpatient case management? No   Do you currently have service(s) that help you manage your care at home? No   Do you take prescription medications? Yes   Do you have prescription coverage? Yes   Coverage Medicare   Do you have any problems affording any of your prescribed medications? No   Is the patient taking medications as prescribed? yes   Who is going to help you get home at discharge? Cherelle   How do you get to doctors appointments? car, drives self   Are you on dialysis? No   Do you take coumadin? No   Discharge Plan A Home;Home Health   DME Needed Upon Discharge  nebulizer;oxygen   Discharge Plan discussed with: Patient  (Adama)   Discharge Barriers Identified None

## 2022-06-29 NOTE — NURSING
Notified dr rutherford that pt is having some coughing spells, she is wheezing, and her heart rate is going to the 150s. MD initially stated to try the cough medication with phenergan and codeine but the pt has and allergy md stated to order some mucinex,

## 2022-06-29 NOTE — PT/OT/SLP EVAL
"Physical Therapy Acute Care Evaluation    Patient Name:  Marcela Braga   MRN:  [id number]    History:     Past Medical History:   Diagnosis Date    Asthma     Coronary artery disease     Unspecified osteoarthritis, unspecified site        Past Surgical History:   Procedure Laterality Date    ANKLE SURGERY Right     APPENDECTOMY      CATARACT EXTRACTION      HYSTERECTOMY      KNEE SURGERY Right     SHOULDER SURGERY Left     TONSILLECTOMY         Recent Surgery: * No surgery found *      Subjective     Chief Complaint: "I feel short of breath when I move"  Patient/Family Comments/goals: "to go home and be able to move around"  Pain/Comfort:  · Pain Rating 1: 0/10      Living Environment:  Pt currently resides at home alone, but does have family available to assist as needed.  Pt resides in a single story home without steps to enter.  Pt utilizes a RW or a rollator at home, depending on how she is feeling. Pt is typically independent with ADLs and mobility, but each have become more difficult as of late.  Pt states that she would become severely short of breath after ambulating only a short distance.  Pt does still manage her own "easy" meals and drives.   Prior to admission, patients level of function was independent.  Equipment used at home: walker, rolling, rollator, grab bar, shower chair, wheelchair, bedside commode.       Objective:     Patient found supine with telemetry  upon PT entry to room.    General Precautions: Standard, fall   Orthopedic Precautions:N/A   Braces: N/A  Respiratory Status: Room air     Vitals Value    Room air  92-96%    Oxygen (L)     Blood pressure     Heart rate         Functional Mobility:     Assist Level Assistive Device Comments    Bed Mobility Ind  Semi-supine to sitting at EOB   Sit to stand/Stand to sit Idris RW Sit to stand/stand to sit   Transfers      Gait Idris RW  Pt able to ambulate 200 ft with a step through gait pattern and steady gait speed.  No LOB observed " and pt expressed confidence with use of the RW.  Pt c/o some minimal shortness of breath with activity, but her sats remained >90% on room air.  Pt plans to continue using her RW upon D/C.   Balance Training      Wheelchair Mobility      Stair Climbing      Car Transfer      Other:             Assessment:     Marcela Braga is a 77 y.o. female admitted with a medical diagnosis of asthma exacerbation.      Additional information: Pt demonstrated independence-modified independence with all aspects of mobility.  Her O2 sats remained >90% both at rest and post-ambulation in the hallway. Pt experienced some minimal shortness of breath, but stated that this was much improved.  No acute PT needs identified at this time.  Will sign off.  This note will serve as D/C summary. Nsg notified of pt's performance.  Pt was encouraged to ambulate in the hallways with the staff.  Staff aware of this request.      Patient left up in chair with all lines intact, call button in reach and chair alarm on.      Recommendations:     Discharge Recommendations:  home     Time Tracking:       PT Start Time: 1416     PT Stop Time: 1449  PT Total Time (min): 33 min     Billable Minutes: Evaluation 33      06/29/2022

## 2022-06-29 NOTE — H&P
History and Physical    Attending Physician: Dr. Pyle      Date of Admit: 6/29/2022    Chief Complaint and Duration    Shortness of Breath (Shortness of breath with exertion)   for    Subjective:    History of Present Illness:    Marcela Braga is a 77 y.o.female who  has a past medical history of Asthma, Coronary artery disease, and Unspecified osteoarthritis, unspecified site.. The patient presented to ER on 6/29/2022 with a primary complaint of Shortness of Breath (Shortness of breath with exertion)    77-year-old  female history of CAD status post LAD stenting on aspirins at and Xarelto also Dr. Betancourt, hypertension, constipation, hypothyroidism, depression, asthma, severe allergies status post allergy injections bowels with ENT presents with worsening cough and shortness of breath x1 week.  Patient states she has been having problems with allergies and having lot of postnasal drip and nasal congestion over last few weeks.  He has been trying all her allergy medications including nasal sprays, Singulair, over-the-counters also received a antibiotic course with doxycycline.  Over last few days patient noticed worsening cough shortness of breath associated with wheezing.  Has been trying her home ProAir which has not improved her symptoms.  Denies fevers, chest pain.  Denies abdominal pain or blood in urine or stool.  No recent new environmental exposures.  Long history of tobacco exposure from her father smoking in the house.  Does not take any controller medications including Breo or Symbicort but was on this in the past.    Presents with above symptoms to [facility] was found hypoxic with exertion.  Noted to be wheezing diagnosis asthma exacerbation.  Patient was not comfortable going home secondary to her hypoxia with exertion.  Labs and x-ray were normal.  Her COVID and flu negative.  Was transferred this this facility at [facility] for further management.    Past Medical  History:    Past Medical History:   Diagnosis Date    Asthma     Coronary artery disease     Unspecified osteoarthritis, unspecified site        Past Surgical History:    Past Surgical History:   Procedure Laterality Date    ANKLE SURGERY Right     APPENDECTOMY      CATARACT EXTRACTION      HYSTERECTOMY      KNEE SURGERY Right     SHOULDER SURGERY Left     TONSILLECTOMY         Allergies:    Review of patient's allergies indicates:   Allergen Reactions    Azithromycin Anaphylaxis    Cefdinir      Other reaction(s): Muscle Cramps    Ciprofloxacin      Other reaction(s): Muscle Cramps, SOB, wheezing    Clindamycin      Other reaction(s): MUSCLE CRAMPS, Myalgias (Muscle Pain)    Codeine     Milk Other (See Comments)    Milk containing products      Other reaction(s): MUSCLE CRAMPS    Erythromycin Rash    Erythromycin base Rash    Meperidine Rash     Other reaction(s): itch    Metronidazole Rash    Morphine Itching and Rash    Penicillins Rash    Sulfa (sulfonamide antibiotics) Rash       Home Medications:    Prior to Admission medications    Medication Sig Start Date End Date Taking? Authorizing Provider   albuterol (PROVENTIL) 2.5 mg /3 mL (0.083 %) nebulizer solution Take 2.5 mg by nebulization every 6 (six) hours as needed for Wheezing. Rescue   Yes Historical Provider   azelastine (ASTELIN) 137 mcg (0.1 %) nasal spray azelastine 137 mcg (0.1 %) nasal spray aerosol   Yes Historical Provider   BECONASE AQ 42 mcg (0.042 %) Spry 2 sprays. 1/31/22  Yes Historical Provider   cetirizine (ZYRTEC) 10 MG tablet Take 10 mg by mouth once daily.   Yes Historical Provider   doxycycline (VIBRAMYCIN) 100 MG Cap 100 mg.   Yes Historical Provider   DULoxetine (CYMBALTA) 60 MG capsule Take 60 mg by mouth once daily. 4/4/22  Yes Historical Provider   estrogens, conjugated, (PREMARIN) 0.3 MG tablet Take 0.3 mg by mouth once daily.   Yes Historical Provider   fluticasone propionate (FLONASE) 50 mcg/actuation  nasal spray fluticasone propionate 50 mcg/actuation nasal spray,suspension   Yes Historical Provider   isosorbide mononitrate (IMDUR) 60 MG 24 hr tablet Take 60 mg by mouth once daily.   Yes Historical Provider   levothyroxine (SYNTHROID) 88 MCG tablet Take 88 mcg by mouth before breakfast.   Yes Historical Provider   metoprolol succinate (TOPROL-XL) 25 MG 24 hr tablet Take 25 mg by mouth once daily.   Yes Historical Provider   montelukast (SINGULAIR) 10 mg tablet Take 10 mg by mouth every evening.   Yes Historical Provider   rosuvastatin (CRESTOR) 20 MG tablet Take 20 mg by mouth once daily.   Yes Historical Provider   XARELTO 2.5 mg Tab Take 1 tablet by mouth 2 (two) times daily. 6/6/22  Yes Historical Provider   fluticasone furoate-vilanteroL (BREO ELLIPTA) 200-25 mcg/dose DsDv diskus inhaler Inhale 1 puff into the lungs. 10/12/21 6/29/22 Yes Historical Provider   levothyroxine (SYNTHROID) 125 MCG tablet Synthroid 125 mcg tablet  6/29/22 Yes Historical Provider   nitroGLYCERIN (NITROSTAT) 0.4 MG SL tablet Place 0.4 mg under the tongue. 10/12/21   Historical Provider   rivaroxaban (XARELTO) 2.5 mg Tab Take by mouth 2 (two) times daily with meals.    Historical Provider       Family History:    History reviewed. No pertinent family history.    Social History:    Social History     Tobacco Use    Smoking status: Never Smoker    Smokeless tobacco: Never Used   Substance Use Topics    Alcohol use: Not Currently           Immunizations:C  Currently on File with [facility]:   There is no immunization history on file for this patient.    Objective:    Vitals:    Vitals:    06/29/22 0119 06/29/22 0325 06/29/22 0350 06/29/22 0800   BP: (!) 141/87 136/79  127/61   BP Location: Left arm      Patient Position: Sitting      Pulse: 97 85 92 85   Resp: 20 20 18 20   Temp: 98.2 °F (36.8 °C) 97.9 °F (36.6 °C)  98.8 °F (37.1 °C)   SpO2: (!) 94% (!) 94% 95% (!) 94%   Weight: 92.5 kg (204 lb) 95.2 kg (209 lb 14.1 oz)    "  Height: 5' 6" (1.676 m) 5' 6" (1.676 m)         IOs:      Intake/Output Summary (Last 24 hours) at 6/29/2022 0805  Last data filed at 6/29/2022 0500  Gross per 24 hour   Intake 240 ml   Output --   Net 240 ml       I/O last 3 completed shifts:  In: 240 [P.O.:240]  Out: -     Physical Examination:    GEN: alert and oriented to person, place, and time, No obvious distress,   conversant  Head: normal cephalic, atraumatic, no lacerations, bruising, hematoma  Eyes: PERRL, extra ocular movements intact,  conjunctivae pink/moist, no sclera icterus  Ears: TM normal, no bulging or drainage.  No mastoid tenderness. No erythema  Neck:Supple. No bruits. No jugular venous distension. No lymphadenopathy.   CV: regular rate rhythm, no murmurs, rubs, gallops,     RESP: wheezing bilaterally    ABD: soft, nontender, non distended, positive bowel sounds, no organomegaly, no scars    EXT: Pulses +2 upper and lower extremity, no edema, no cyanosis, capillary refil < 3 seconds     NEURO: Cranial nerves 2-12 grossly intact. Motor 5/5 strength. Sensation to light touch wnl. Reflexes + 2 upper extremities and lower extremities     SKIN:No rashes or lesions. No ulcer. Normal temperature     PSYCH: Normal affect. Normal mood. Approriate       Review of Systems:    Constitutional - no lethargy, night sweats, or weight loss   Eyes - no blurry vision, redness, or pain   Ears, Nose, Mouth, Throat - no ear pain, nasal congestion, runny nose, or dysphagia  Cardiovascular - no palpitations, or syncope, no chest pain  Respiratory - no trouble breathing, or wheezing   Gastrointestinal - no abdominal pain, no nausea, no vomiting   Genitourinary - no trouble urinating, no dysuria   Musculoskeletal - no joint swelling, redness, no joint pain  Integumentary (skin and/or breast)  Neurological - no confusion, memory loss, no arm and leg weakness  Psychiatric - no depression, no suicidial ideation   Endocrine - no sweating, tremors,   Hematologic/Lymphatic " - no lymph node swelling        Laboratory:    Laboratory Data Reviewed: Yes    Microbiology Data Reviewed: Yes    Imaging personally reviewed    Tests ordered    Chart reviewed    Recent Labs   Lab 06/28/22 2009   WBC 8.4   HGB 11.0*         K 4.3   CO2 23   BUN 12.0   CALCIUM 9.5   ALBUMIN 3.1*   BILITOT 0.4   AST 12   ALT 7   TROPONINI <0.010       Microbiology Data:        EKG (my interpretation):    Radiology:    No results found.    ?    Assessment:    Marcela Braga is a 77 y.o. female with:    Present on Admission:  **None**    #Asthma exacerbation, acute on chronic  #acute respiratory failure, improved  #CAD  # HTN  #AR    Plan:    - Asthma pathway started   - Continue steroids, duonebs, pulmicort and supportive care    - Escalate cough medication to include opiate (not allergic, has normal side effect to opiates with itching)   - Monitor for fevers   - No oxygen requirements at rest, will determine if she needs home 02 with exertion in next 24-48 hours after further treatment  - Continue home medications  - Start treatment for candidiasis in skin folds  - PT consult       F - no fluids  E - stable  N - Cardiac diet  L - PIV  P - PT consulted   DVT - Xarelto (per cardiology for hx of ? DVT and CAD)      DISPO - improvement in asthma exacerbation, suspect d/c in 48 hours, PT assessment, ma they need home health     David Pyle

## 2022-06-29 NOTE — PLAN OF CARE
Problem: Adult Inpatient Plan of Care  Goal: Plan of Care Review  Outcome: Ongoing, Progressing  Goal: Patient-Specific Goal (Individualized)  Outcome: Ongoing, Progressing  Goal: Absence of Hospital-Acquired Illness or Injury  Outcome: Ongoing, Progressing  Goal: Optimal Comfort and Wellbeing  Outcome: Ongoing, Progressing  Goal: Readiness for Transition of Care  Outcome: Ongoing, Progressing     Problem: Impaired Wound Healing  Goal: Optimal Wound Healing  Outcome: Ongoing, Progressing     Problem: Fall Injury Risk  Goal: Absence of Fall and Fall-Related Injury  Outcome: Ongoing, Progressing     Problem: Asthma Exacerbation  Goal: Asthma Symptom Relief  Outcome: Ongoing, Progressing

## 2022-06-29 NOTE — ED PROVIDER NOTES
Encounter Date: 6/28/2022       History     Chief Complaint   Patient presents with    Shortness of Breath     Shortness of breath with exertion     77-year-old female with a past medical history of CAD status post stenting, asthma, seasonal allergies who presents to the emergency department as a transfer from [place] where she was evaluated for shortness of breath and wheezing. Onset of symptoms approximately 1 week ago and has been improving since the weekend. She has been using an albuterol inhaler at home with moderate short term relieve of symptoms. She also had 4 days of left over doxycycline from a previous tooth infection that she took. She endorses sputum production. At [place] she was given duo neb 2.5mg albuterol x4 and 40mg Prednisone. Her symptoms improved however when she walked she desatted into the low 80s and was short of breath. No oxygen requirements at baseline. Currently comfortable at rest on RA. Denies fever, chills, HA, dizziness, chest pain, palpitations, abdominal pain, n/v or diarrhea.         Review of patient's allergies indicates:   Allergen Reactions    Azithromycin Anaphylaxis    Cefdinir      Other reaction(s): Muscle Cramps    Ciprofloxacin      Other reaction(s): Muscle Cramps, SOB, wheezing    Clindamycin      Other reaction(s): MUSCLE CRAMPS, Myalgias (Muscle Pain)    Codeine     Milk Other (See Comments)    Milk containing products      Other reaction(s): MUSCLE CRAMPS    Erythromycin Rash    Erythromycin base Rash    Meperidine Rash     Other reaction(s): itch    Metronidazole Rash    Morphine Itching and Rash    Penicillins Rash    Sulfa (sulfonamide antibiotics) Rash     Past Medical History:   Diagnosis Date    Asthma     Coronary artery disease     Unspecified osteoarthritis, unspecified site      Past Surgical History:   Procedure Laterality Date    ANKLE SURGERY Right     APPENDECTOMY      CATARACT EXTRACTION      HYSTERECTOMY      KNEE SURGERY  Right     SHOULDER SURGERY Left     TONSILLECTOMY       History reviewed. No pertinent family history.  Social History     Tobacco Use    Smoking status: Never Smoker    Smokeless tobacco: Never Used   Substance Use Topics    Alcohol use: Not Currently     Review of Systems   Constitutional: Negative for fever.   HENT: Negative for sore throat.    Respiratory: Positive for cough, shortness of breath and wheezing.    Cardiovascular: Negative for chest pain.   Gastrointestinal: Negative for nausea.   Genitourinary: Negative for dysuria.   Musculoskeletal: Negative for back pain.   Skin: Negative for rash.   Neurological: Negative for weakness.   Hematological: Does not bruise/bleed easily.       Physical Exam     Initial Vitals [06/29/22 0119]   BP Pulse Resp Temp SpO2   (!) 141/87 97 20 98.2 °F (36.8 °C) (!) 94 %      MAP       --         Physical Exam    Nursing note and vitals reviewed.  Constitutional: She appears well-developed and well-nourished.   HENT:   Head: Normocephalic and atraumatic.   Right Ear: External ear normal.   Left Ear: External ear normal.   Nose: Nose normal.   Mouth/Throat: Oropharynx is clear and moist.   Eyes: Conjunctivae and EOM are normal. Pupils are equal, round, and reactive to light.   Neck: Neck supple. No JVD present.   Normal range of motion.  Cardiovascular: Normal rate and regular rhythm.   Pulmonary/Chest: No accessory muscle usage. No tachypnea. No respiratory distress. She has no decreased breath sounds. She has wheezes in the right upper field, the right lower field, the left upper field and the left lower field. She has no rhonchi. She has no rales. She exhibits no tenderness.   Abdominal: Abdomen is soft. Bowel sounds are normal. She exhibits no distension. There is no abdominal tenderness. There is no guarding.   Musculoskeletal:         General: No edema. Normal range of motion.      Cervical back: Normal range of motion and neck supple.     Neurological: She is  alert and oriented to person, place, and time. She has normal strength. No cranial nerve deficit.   Skin: Skin is warm and dry. Capillary refill takes less than 2 seconds. No pallor.   Psychiatric: She has a normal mood and affect. Her behavior is normal. Thought content normal.         ED Course   Procedures  Labs Reviewed - No data to display       Imaging Results    None          Medications   predniSONE tablet 40 mg (has no administration in time range)                 ED Course as of 06/29/22 0227 Wed Jun 29, 2022   0138 Chart Review:    X-ray without infiltrate. Lab work reassuring. Vitals stable. Covid/flu negative.  [JE]   0223 Spoke with Dr. Pyle prior to transfer. He requests evaluation in the emergency department and transfer to floor if stable. On multiple repeat exams patient appears stable. No increased work of breathing, mild expiratory wheezing. Vitals stable. Afebrile. Will admit to the floor with scheduled nebulizer treatments and we will continue prednisone. Will need pt/ot [JE]      ED Course User Index  [JE] Brigido Dodge MD             Clinical Impression:   Final diagnoses:  [J45.901] Asthma exacerbation  [R09.02] Hypoxia (Primary)          ED Disposition Condition    Admit               Brigido Dodge MD  06/29/22 0227

## 2022-06-29 NOTE — CONSULTS
Nutrition   Educaton Note    Nutrition Education:    Patient educated on: Heart Healthy Diet.      Teaching Method:  Explanation and Printed Materials    Patient's Understanding: Verbalizes understanding    Barriers to learning: None evident    Expected Compliance:  fair    All questions were answered. Dietitian's contact information provided.

## 2022-06-29 NOTE — ED PROVIDER NOTES
Encounter Date: 6/28/2022       History     Chief Complaint   Patient presents with    Shortness of Breath     Pt states began 1 week ago with sinus symptoms, cough, and SOB worse today. Reports o2 sat at home below 90%.      The history is provided by the patient.     77-year-old female with a past medical history of CAD status post stenting, asthma, seasonal allergies presents emergency department for cough, shortness of breath.  Patient states that she has been fighting a sinus congestion for last week.  She states that she thinks that the infection got into her lungs.  Patient states that she had tooth as infected a few weeks ago and was given some doxycycline and stopped taking after 1 pill but then started taking the doxycycline because she thought she had a sinus infection.  She states that she has used her inhaler once which did help.  Denies any fevers or chills.  No chest pain.  Denies any abdominal pain.    Review of patient's allergies indicates:   Allergen Reactions    Azithromycin Anaphylaxis    Cefdinir      Other reaction(s): Muscle Cramps    Ciprofloxacin      Other reaction(s): Muscle Cramps, SOB, wheezing    Clindamycin      Other reaction(s): MUSCLE CRAMPS, Myalgias (Muscle Pain)    Codeine     Milk containing products      Other reaction(s): MUSCLE CRAMPS    Erythromycin base Rash    Meperidine Rash     Other reaction(s): itch    Metronidazole Rash    Morphine Itching and Rash    Penicillins Rash    Sulfa (sulfonamide antibiotics) Rash     Past Medical History:   Diagnosis Date    Asthma     Coronary artery disease     Unspecified osteoarthritis, unspecified site      Past Surgical History:   Procedure Laterality Date    ANKLE SURGERY Right     APPENDECTOMY      CATARACT EXTRACTION      HYSTERECTOMY      KNEE SURGERY Right     SHOULDER SURGERY Left     TONSILLECTOMY       No family history on file.  Social History     Tobacco Use    Smoking status: Never Smoker     Smokeless tobacco: Never Used   Substance Use Topics    Alcohol use: Not Currently     Review of Systems   Constitutional: Negative for fever.   HENT: Positive for congestion, postnasal drip and rhinorrhea.    Respiratory: Positive for cough and shortness of breath.    Cardiovascular: Negative for chest pain and palpitations.   Gastrointestinal: Negative for abdominal pain.   All other systems reviewed and are negative.      Physical Exam     Initial Vitals [06/28/22 1920]   BP Pulse Resp Temp SpO2   102/68 92 20 97.6 °F (36.4 °C) (!) 92 %      MAP       --         Physical Exam    Nursing note and vitals reviewed.  Constitutional: She appears well-developed and well-nourished. No distress.   Cardiovascular: Regular rhythm.   Pulmonary/Chest: No respiratory distress. She has wheezes. She has no rhonchi. She has no rales.   Abdominal: Abdomen is soft. Bowel sounds are normal. There is no abdominal tenderness.   Musculoskeletal:         General: No tenderness. Normal range of motion.     Neurological: She is alert and oriented to person, place, and time.   Skin: Skin is warm. Capillary refill takes less than 2 seconds.   Psychiatric: She has a normal mood and affect. Thought content normal.         ED Course   Procedures  Labs Reviewed   COMPREHENSIVE METABOLIC PANEL - Abnormal; Notable for the following components:       Result Value    Albumin Level 3.1 (*)     Globulin 3.6 (*)     Albumin/Globulin Ratio 0.9 (*)     Alkaline Phosphatase 37 (*)     All other components within normal limits   CBC WITH DIFFERENTIAL - Abnormal; Notable for the following components:    RBC 3.65 (*)     Hgb 11.0 (*)     Hct 33.8 (*)     MCHC 32.5 (*)     IG# 0.03 (*)     All other components within normal limits   COVID/FLU A&B PCR - Normal   TROPONIN I - Normal   CBC W/ AUTO DIFFERENTIAL    Narrative:     The following orders were created for panel order CBC auto differential.  Procedure                               Abnormality          Status                     ---------                               -----------         ------                     CBC with Differential[[id number]]        Abnormal            Final result                 Please view results for these tests on the individual orders.     EKG Readings: (Independently Interpreted)   Initial Reading: No STEMI. Rhythm: Normal Sinus Rhythm. Heart Rate: 89. ST Segments: Non-Specific ST Segment Depression. T Waves: Normal. Clinical Impression: Normal Sinus Rhythm     ECG Results          EKG 12-lead (In process)  Result time 06/28/22 20:35:28    In process by Interface, Lab In [facility] (06/28/22 20:35:28)                 Narrative:    Test Reason : R06.02,    Vent. Rate : 089 BPM     Atrial Rate : 089 BPM     P-R Int : 142 ms          QRS Dur : 084 ms      QT Int : 352 ms       P-R-T Axes : 052 025 -19 degrees     QTc Int : 428 ms    Normal sinus rhythm  Nonspecific ST and T wave abnormality  Abnormal ECG  No previous ECGs available    Referred By:             Confirmed By:                             Imaging Results          X-Ray Chest AP Portable (Preliminary result)  Result time 06/28/22 20:21:48    ED Interpretation by Pineda Walter MD (06/28/22 20:21:48, [facility] - Emergency Dept, Emergency Medicine)    No consolidations appreciated                               Medications   albuterol-ipratropium 2.5 mg-0.5 mg/3 mL nebulizer solution 3 mL (3 mLs Nebulization Given 6/28/22 2316)   budesonide nebulizer solution 0.5 mg (0.5 mg Nebulization Given 6/28/22 2300)   albuterol-ipratropium 2.5 mg-0.5 mg/3 mL nebulizer solution 3 mL (3 mLs Nebulization Given 6/28/22 2028)   albuterol-ipratropium 2.5 mg-0.5 mg/3 mL nebulizer solution 3 mL (3 mLs Nebulization Given 6/28/22 2125)   predniSONE tablet 40 mg (40 mg Oral Given 6/28/22 2121)   albuterol-ipratropium 2.5 mg-0.5 mg/3 mL nebulizer solution 3 mL (3 mLs Nebulization Given 6/28/22 2227)     Medical Decision Making:    Differential Diagnosis:   Viral syndrome, cough, flu, COVID, pneumonia, asthma             ED Course as of 06/28/22 2339   Tue Jun 28, 2022 2117 Patient feeling much better.  States shortness of breath has improved.  Will give some steroids another breathing treatment as patient is still having wheezing. [BS]   2255 Patient's oxygen sat dropped to 86% on room air while ambulating.  Will need to admit patient for asthma exacerbation. [BS]   2327 Dr. Dodge at [place] [facility] will speak with hospitalist to make sure they are okay with transfer vs direct admit [BS]   2338 Hospitalist is okay for patient to go to [place].  Patient will go ER to ER [BS]      ED Course User Index  [BS] Pineda Walter MD             Clinical Impression:   Final diagnoses:  [R06.02] Shortness of breath  [J45.901] Exacerbation of asthma, unspecified asthma severity, unspecified whether persistent (Primary)  [R09.02] Hypoxia          ED Disposition Condition    Transfer to Another Facility Stable              Pineda Walter MD  06/28/22 4066

## 2022-06-30 PROCEDURE — 94640 AIRWAY INHALATION TREATMENT: CPT | Mod: XB

## 2022-06-30 PROCEDURE — 25000003 PHARM REV CODE 250: Performed by: INTERNAL MEDICINE

## 2022-06-30 PROCEDURE — 94760 N-INVAS EAR/PLS OXIMETRY 1: CPT

## 2022-06-30 PROCEDURE — 25000242 PHARM REV CODE 250 ALT 637 W/ HCPCS: Performed by: INTERNAL MEDICINE

## 2022-06-30 PROCEDURE — 63600175 PHARM REV CODE 636 W HCPCS: Performed by: INTERNAL MEDICINE

## 2022-06-30 PROCEDURE — 94761 N-INVAS EAR/PLS OXIMETRY MLT: CPT

## 2022-06-30 PROCEDURE — G0378 HOSPITAL OBSERVATION PER HR: HCPCS

## 2022-06-30 RX ADMIN — CIPROFLOXACIN AND DEXAMETHASONE 4 DROP: 3; 1 SUSPENSION/ DROPS AURICULAR (OTIC) at 09:06

## 2022-06-30 RX ADMIN — IPRATROPIUM BROMIDE AND ALBUTEROL SULFATE 3 ML: 2.5; .5 SOLUTION RESPIRATORY (INHALATION) at 08:06

## 2022-06-30 RX ADMIN — ISOSORBIDE MONONITRATE 60 MG: 30 TABLET, EXTENDED RELEASE ORAL at 09:06

## 2022-06-30 RX ADMIN — LEVOTHYROXINE SODIUM 88 MCG: 88 TABLET ORAL at 05:06

## 2022-06-30 RX ADMIN — FLUTICASONE PROPIONATE 50 MCG: 50 SPRAY, METERED NASAL at 09:06

## 2022-06-30 RX ADMIN — ATORVASTATIN CALCIUM 40 MG: 40 TABLET, FILM COATED ORAL at 09:06

## 2022-06-30 RX ADMIN — CEFEPIME 1 G: 1 INJECTION, POWDER, FOR SOLUTION INTRAMUSCULAR; INTRAVENOUS at 02:06

## 2022-06-30 RX ADMIN — IPRATROPIUM BROMIDE AND ALBUTEROL SULFATE 3 ML: 2.5; .5 SOLUTION RESPIRATORY (INHALATION) at 05:06

## 2022-06-30 RX ADMIN — CEFEPIME 1 G: 1 INJECTION, POWDER, FOR SOLUTION INTRAMUSCULAR; INTRAVENOUS at 11:06

## 2022-06-30 RX ADMIN — FAMOTIDINE 20 MG: 20 TABLET ORAL at 09:06

## 2022-06-30 RX ADMIN — GUAIFENESIN 1200 MG: 600 TABLET, EXTENDED RELEASE ORAL at 09:06

## 2022-06-30 RX ADMIN — ASPIRIN 81 MG: 81 TABLET, COATED ORAL at 09:06

## 2022-06-30 RX ADMIN — IPRATROPIUM BROMIDE AND ALBUTEROL SULFATE 3 ML: 2.5; .5 SOLUTION RESPIRATORY (INHALATION) at 12:06

## 2022-06-30 RX ADMIN — DULOXETINE HYDROCHLORIDE 60 MG: 60 CAPSULE, DELAYED RELEASE ORAL at 09:06

## 2022-06-30 RX ADMIN — SENNOSIDES AND DOCUSATE SODIUM 1 TABLET: 50; 8.6 TABLET ORAL at 09:06

## 2022-06-30 RX ADMIN — METOPROLOL SUCCINATE 25 MG: 25 TABLET, EXTENDED RELEASE ORAL at 09:06

## 2022-06-30 RX ADMIN — RIVAROXABAN 2.5 MG: 2.5 TABLET, FILM COATED ORAL at 09:06

## 2022-06-30 RX ADMIN — MICONAZOLE NITRATE: 20 CREAM TOPICAL at 11:06

## 2022-06-30 RX ADMIN — METHYLPREDNISOLONE SODIUM SUCCINATE 60 MG: 40 INJECTION, POWDER, FOR SOLUTION INTRAMUSCULAR; INTRAVENOUS at 09:06

## 2022-06-30 RX ADMIN — MELATONIN TAB 3 MG 6 MG: 3 TAB at 09:06

## 2022-06-30 RX ADMIN — CEFEPIME 1 G: 1 INJECTION, POWDER, FOR SOLUTION INTRAMUSCULAR; INTRAVENOUS at 06:06

## 2022-06-30 RX ADMIN — MICONAZOLE NITRATE: 20 CREAM TOPICAL at 09:06

## 2022-06-30 RX ADMIN — FLUCONAZOLE, SODIUM CHLORIDE 200 MG: 2 INJECTION INTRAVENOUS at 03:06

## 2022-06-30 RX ADMIN — BUDESONIDE 0.5 MG: 0.5 INHALANT ORAL at 05:06

## 2022-06-30 RX ADMIN — MONTELUKAST SODIUM 10 MG: 10 TABLET, COATED ORAL at 09:06

## 2022-06-30 NOTE — PROGRESS NOTES
[facility] Medical Surgical Unit  [facility] Medicine  Progress Note        Subjective:    Marcela Braga is a 77 y.o. female who is being followed by the Medicine service for    Objective: Improved. Less dyspnea. Ambulating without oxygen, strenght improving. Afebrile. Wheezing improved     Vitals:    Vitals:    06/30/22 0400 06/30/22 0552 06/30/22 0554 06/30/22 0600   BP: 127/62      BP Location:       Patient Position:       Pulse: 84 84 84    Resp: 20 18 18    Temp: 98.1 °F (36.7 °C)      SpO2: (!) 92% 95% 95%    Weight:    95.6 kg (210 lb 12.2 oz)   Height:           IOs:      Intake/Output Summary (Last 24 hours) at 6/30/2022 0837  Last data filed at 6/30/2022 0400  Gross per 24 hour   Intake 660 ml   Output --   Net 660 ml       I/O last 3 completed shifts:  In: 1440 [P.O.:1440]  Out: -     ROS: No chest pain, no dyspnea, no abdominal pain, no nausea, vomiting, no fevers      Physical Examination:    GEN: AOx3, NAD    HEENT: NC AT, PERRL, conjunctivae pink/moist, no sclera icterus, EOMi. TM normal. OP clear. No LAD.    Neck:No bruits. No JVD    CV: RRR, NO MRG    RESP:bilateral wheezing, R>L    ABD: S, NT, ND, +BS, no organomegaly, no scars    EXT: Pulses +2 U/LE, no edema    NEURO: CN 2-12 grossly intact. Motor 5/5. Sensation to light touch wnl    SKIN:No rashes or lesions    PSYCH: Normal affect. Normal mood.        Laboratory:    Laboratory Data Reviewed: Yes    Microbiology Data Reviewed: Yes    Imaging personally reviewed    Tests ordered    Chart reviewed      Recent Labs   Lab 06/28/22 2009 06/29/22  1148   WBC 8.4 12.4*   HGB 11.0* 11.4*    271     --    K 4.3  --    CO2 23  --    BUN 12.0  --    CALCIUM 9.5  --    ALBUMIN 3.1*  --    BILITOT 0.4  --    AST 12  --    ALT 7  --    TROPONINI <0.010  --        Other Results:    EKG (my interpretation):    Radiology Data Reviewed: Yes    Current Medications:    Infusions:        Scheduled:     aspirin  81 mg Oral Daily     atorvastatin  40 mg Oral Daily    budesonide  0.5 mg Nebulization Daily    ceFEPime (MAXIPIME) IVPB  1 g Intravenous Q8H    ciprofloxacin-dexamethasone 0.3-0.1%  4 drop Left Ear BID    DULoxetine  60 mg Oral Daily    famotidine  20 mg Oral Daily    fluconazole (DIFLUCAN) IV (PEDS and ADULTS)  200 mg Intravenous Q24H    fluticasone propionate  1 spray Each Nostril Daily    guaiFENesin  1,200 mg Oral BID    isosorbide mononitrate  60 mg Oral Daily    levothyroxine  88 mcg Oral Before breakfast    methylPREDNISolone sodium succinate injection  60 mg Intravenous Q12H    metoprolol succinate  25 mg Oral Daily    miconazole   Topical (Top) BID    montelukast  10 mg Oral Nightly    rivaroxaban  2.5 mg Oral BID    senna-docusate 8.6-50 mg  1 tablet Oral BID       PRN:    acetaminophen, albuterol-ipratropium, benzonatate, dextromethorphan-guaiFENesin  mg/5 ml, diazePAM, melatonin, ondansetron, sodium chloride 0.9%      Assessment:    Marcela Braga is a 77 y.o.female with    Present on Admission:  **None**       #Asthma exacerbation, acute on chronic  #acute respiratory failure, improved  #CAD  # HTN  #AR     Plan:     - Asthma pathway started              - Continue steroids, duonebs, pulmicort and supportive care                        - Monitor for fevers              - No oxygen requirements at rest, or with ambulation today   - Plan for D/C in AM with home health if she continues to improve, strength much improved and tolerating PO       - Continue home medications  - Start treatment for candidiasis/yeast in skin folds    - PT/OT following     F - no fluids  E - stable  N - Cardiac diet  L - PIV  P - PT consulted   DVT - Xarelto (per cardiology for hx of ? DVT and CAD)        DISPO - d/c in AM       David Pyle

## 2022-07-01 VITALS
TEMPERATURE: 98 F | WEIGHT: 212.5 LBS | SYSTOLIC BLOOD PRESSURE: 134 MMHG | HEIGHT: 66 IN | HEART RATE: 81 BPM | OXYGEN SATURATION: 98 % | BODY MASS INDEX: 34.15 KG/M2 | DIASTOLIC BLOOD PRESSURE: 70 MMHG | RESPIRATION RATE: 21 BRPM

## 2022-07-01 PROBLEM — J45.901 MODERATE ASTHMA WITH EXACERBATION: Status: ACTIVE | Noted: 2022-07-01

## 2022-07-01 PROCEDURE — 25000242 PHARM REV CODE 250 ALT 637 W/ HCPCS: Performed by: INTERNAL MEDICINE

## 2022-07-01 PROCEDURE — 63600175 PHARM REV CODE 636 W HCPCS: Performed by: INTERNAL MEDICINE

## 2022-07-01 PROCEDURE — 27000221 HC OXYGEN, UP TO 24 HOURS

## 2022-07-01 PROCEDURE — 25000003 PHARM REV CODE 250: Performed by: INTERNAL MEDICINE

## 2022-07-01 PROCEDURE — 11000001 HC ACUTE MED/SURG PRIVATE ROOM

## 2022-07-01 PROCEDURE — 94761 N-INVAS EAR/PLS OXIMETRY MLT: CPT

## 2022-07-01 PROCEDURE — 94640 AIRWAY INHALATION TREATMENT: CPT

## 2022-07-01 RX ORDER — DOXYLAMINE SUCCINATE 25 MG
TABLET ORAL 2 TIMES DAILY
Status: DISCONTINUED | OUTPATIENT
Start: 2022-07-01 | End: 2022-07-01 | Stop reason: HOSPADM

## 2022-07-01 RX ORDER — CEFDINIR 300 MG/1
300 CAPSULE ORAL EVERY 12 HOURS
Status: DISCONTINUED | OUTPATIENT
Start: 2022-07-01 | End: 2022-07-01 | Stop reason: HOSPADM

## 2022-07-01 RX ORDER — FLUCONAZOLE 200 MG/1
200 TABLET ORAL DAILY
Qty: 3 TABLET | Refills: 0 | Status: SHIPPED | OUTPATIENT
Start: 2022-07-01 | End: 2022-07-04

## 2022-07-01 RX ORDER — ASPIRIN 81 MG/1
81 TABLET ORAL DAILY
Qty: 90 TABLET | Refills: 3 | Status: SHIPPED | OUTPATIENT
Start: 2022-07-01 | End: 2023-07-01

## 2022-07-01 RX ORDER — GUAIFENESIN/DEXTROMETHORPHAN 100-10MG/5
10 SYRUP ORAL EVERY 6 HOURS PRN
Qty: 100 ML | Refills: 0 | Status: SHIPPED | OUTPATIENT
Start: 2022-07-01 | End: 2022-07-11

## 2022-07-01 RX ORDER — LEVOTHYROXINE SODIUM 88 UG/1
88 TABLET ORAL
Qty: 30 TABLET | Refills: 11 | Status: SHIPPED | OUTPATIENT
Start: 2022-07-02 | End: 2023-07-02

## 2022-07-01 RX ORDER — IPRATROPIUM BROMIDE AND ALBUTEROL SULFATE 2.5; .5 MG/3ML; MG/3ML
3 SOLUTION RESPIRATORY (INHALATION) EVERY 6 HOURS PRN
Qty: 75 ML | Refills: 0 | Status: SHIPPED | OUTPATIENT
Start: 2022-07-01 | End: 2023-07-01

## 2022-07-01 RX ORDER — GUAIFENESIN 1200 MG/1
1200 TABLET, EXTENDED RELEASE ORAL 2 TIMES DAILY
Qty: 10 TABLET | Refills: 0 | Status: SHIPPED | OUTPATIENT
Start: 2022-07-01 | End: 2022-07-11

## 2022-07-01 RX ORDER — DOXYCYCLINE 100 MG/1
100 CAPSULE ORAL EVERY 12 HOURS
Qty: 10 CAPSULE | Refills: 0 | Status: SHIPPED | OUTPATIENT
Start: 2022-07-01 | End: 2022-07-06

## 2022-07-01 RX ORDER — CEFDINIR 300 MG/1
300 CAPSULE ORAL EVERY 12 HOURS
Qty: 20 CAPSULE | Refills: 0 | Status: SHIPPED | OUTPATIENT
Start: 2022-07-01 | End: 2022-07-01 | Stop reason: HOSPADM

## 2022-07-01 RX ORDER — PREDNISONE 20 MG/1
40 TABLET ORAL DAILY
Qty: 10 TABLET | Refills: 0 | Status: SHIPPED | OUTPATIENT
Start: 2022-07-01 | End: 2022-07-06

## 2022-07-01 RX ADMIN — IPRATROPIUM BROMIDE AND ALBUTEROL SULFATE 3 ML: 2.5; .5 SOLUTION RESPIRATORY (INHALATION) at 12:07

## 2022-07-01 RX ADMIN — SENNOSIDES AND DOCUSATE SODIUM 1 TABLET: 50; 8.6 TABLET ORAL at 09:07

## 2022-07-01 RX ADMIN — GUAIFENESIN 1200 MG: 600 TABLET, EXTENDED RELEASE ORAL at 09:07

## 2022-07-01 RX ADMIN — CIPROFLOXACIN AND DEXAMETHASONE 4 DROP: 3; 1 SUSPENSION/ DROPS AURICULAR (OTIC) at 09:07

## 2022-07-01 RX ADMIN — IPRATROPIUM BROMIDE AND ALBUTEROL SULFATE 3 ML: 2.5; .5 SOLUTION RESPIRATORY (INHALATION) at 06:07

## 2022-07-01 RX ADMIN — MICONAZOLE NITRATE: 20 CREAM TOPICAL at 09:07

## 2022-07-01 RX ADMIN — ATORVASTATIN CALCIUM 40 MG: 40 TABLET, FILM COATED ORAL at 09:07

## 2022-07-01 RX ADMIN — DULOXETINE HYDROCHLORIDE 60 MG: 60 CAPSULE, DELAYED RELEASE ORAL at 09:07

## 2022-07-01 RX ADMIN — FLUTICASONE PROPIONATE 50 MCG: 50 SPRAY, METERED NASAL at 09:07

## 2022-07-01 RX ADMIN — METOPROLOL SUCCINATE 25 MG: 25 TABLET, EXTENDED RELEASE ORAL at 09:07

## 2022-07-01 RX ADMIN — LEVOTHYROXINE SODIUM 88 MCG: 88 TABLET ORAL at 05:07

## 2022-07-01 RX ADMIN — CEFEPIME 1 G: 1 INJECTION, POWDER, FOR SOLUTION INTRAMUSCULAR; INTRAVENOUS at 02:07

## 2022-07-01 RX ADMIN — BUDESONIDE 0.5 MG: 0.5 INHALANT ORAL at 06:07

## 2022-07-01 RX ADMIN — ASPIRIN 81 MG: 81 TABLET, COATED ORAL at 09:07

## 2022-07-01 RX ADMIN — ISOSORBIDE MONONITRATE 60 MG: 30 TABLET, EXTENDED RELEASE ORAL at 09:07

## 2022-07-01 RX ADMIN — CEFEPIME 1 G: 1 INJECTION, POWDER, FOR SOLUTION INTRAMUSCULAR; INTRAVENOUS at 10:07

## 2022-07-01 RX ADMIN — FAMOTIDINE 20 MG: 20 TABLET ORAL at 09:07

## 2022-07-01 RX ADMIN — RIVAROXABAN 2.5 MG: 2.5 TABLET, FILM COATED ORAL at 09:07

## 2022-07-01 NOTE — PLAN OF CARE
07/01/22 1029   Discharge Reassessment   Assessment Type Final Discharge Note   Discharge Plan discussed with: Patient  (Nilaura)   DME Needed Upon Discharge    (Nebulizer ordered)   Post-Acute Status   Post-Acute Authorization Home Health   Home Health Status Set-up Complete/Auth obtained   Coverage Medicare   Patient choice form signed by patient/caregiver List with quality metrics by geographic area provided

## 2022-07-01 NOTE — DISCHARGE SUMMARY
"        [facility] - Medical Surgical Unit  Discharge Note  Short Stay    * No surgery found *    OUTCOME: Condition has improved and patient is now ready for discharge.    DISPOSITION: Home-Health Care [facility]    FINAL DIAGNOSIS:  Moderate asthma with exacerbation    FOLLOWUP: With primary care provider    DISCHARGE INSTRUCTIONS:    Discharge Procedure Orders   NEBULIZER FOR HOME USE     Order Specific Question Answer Comments   Height: 5' 6" (1.676 m)    Weight: 96.4 kg (212 lb 8.4 oz)    Does patient have medical equipment at home? walker, rolling    Does patient have medical equipment at home? rollator    Does patient have medical equipment at home? grab bar    Does patient have medical equipment at home? shower chair    Does patient have medical equipment at home? wheelchair    Does patient have medical equipment at home? bedside commode    Length of need (1-99 months): 3      Diet Cardiac     Notify your health care provider if you experience any of the following:  persistent nausea and vomiting or diarrhea     Notify your health care provider if you experience any of the following:  temperature >100.4     Activity as tolerated        TIME SPENT ON DISCHARGE: 30 minutes      [facility] Discharge Summary      Attending Physician: David Pyle MD      Date of Admit: 6/29/2022  Date of Discharge: 7/1/2022    Discharge to: Home  Condition: Stable    Discharge Diagnoses     Patient Active Problem List   Diagnosis    Moderate asthma with exacerbation           Brief History of Present Illness        77-year-old  female history of CAD status post LAD stenting on aspirins at and Xarelto also Dr. Betancourt, hypertension, constipation, hypothyroidism, depression, asthma, severe allergies status post allergy injections bowels with ENT presents with worsening cough and shortness of breath x1 week.  Patient states she has been having problems with allergies and having lot of postnasal drip and nasal " congestion over last few weeks.  He has been trying all her allergy medications including nasal sprays, Singulair, over-the-counters also received a antibiotic course with doxycycline.  Over last few days patient noticed worsening cough shortness of breath associated with wheezing.  Has been trying her home ProAir which has not improved her symptoms.  Denies fevers, chest pain.  Denies abdominal pain or blood in urine or stool.  No recent new environmental exposures.  Long history of tobacco exposure from her father smoking in the house.  Does not take any controller medications including Breo or Symbicort but was on this in the past.     Presents with above symptoms to [facility] was found hypoxic with exertion.  Noted to be wheezing diagnosis asthma exacerbation.  Patient was not comfortable going home secondary to her hypoxia with exertion.  Labs and x-ray were normal.  Her COVID and flu negative.  Was transferred this this facility at [facility] for further management.    Improved after 3 days of inpatient asthma treatment. Cultures negative. No oxygen needs at discharge. Home health provided at discharge. Provided steroids, nebulizer, antibiotics and antifungals for yeast pannus infection.          Discharge Medications        Medication List      START taking these medications    albuterol-ipratropium 2.5 mg-0.5 mg/3 mL nebulizer solution  Commonly known as: DUO-NEB  Take 3 mLs by nebulization every 6 (six) hours as needed for Wheezing. Rescue     aspirin 81 MG EC tablet  Commonly known as: ECOTRIN  Take 1 tablet (81 mg total) by mouth once daily.     DOXCYCLINE 100 MG PO BID in lieu of cefdinir (possible allergy)   dextromethorphan-guaiFENesin  mg/5 ml  mg/5 mL liquid  Commonly known as: ROBITUSSIN-DM  Take 10 mLs by mouth every 6 (six) hours as needed.     MUCINEX 1,200 mg Ta12  Generic drug: guaiFENesin  Take 1,200 mg by mouth 2 (two) times a day. for 10 days     predniSONE 20  MG tablet  Commonly known as: DELTASONE  Take 2 tablets (40 mg total) by mouth once daily. for 5 days        CHANGE how you take these medications    XARELTO 2.5 mg Tab  Generic drug: rivaroxaban  What changed: Another medication with the same name was removed. Continue taking this medication, and follow the directions you see here.        CONTINUE taking these medications    albuterol 2.5 mg /3 mL (0.083 %) nebulizer solution  Commonly known as: PROVENTIL     azelastine 137 mcg (0.1 %) nasal spray  Commonly known as: ASTELIN     BECONASE AQ 42 mcg (0.042 %) Spry  Generic drug: beclomethasone     cetirizine 10 MG tablet  Commonly known as: ZYRTEC     DULoxetine 60 MG capsule  Commonly known as: CYMBALTA     estrogens (conjugated) 0.3 MG tablet  Commonly known as: PREMARIN     fluticasone propionate 50 mcg/actuation nasal spray  Commonly known as: FLONASE     isosorbide mononitrate 60 MG 24 hr tablet  Commonly known as: IMDUR     levothyroxine 88 MCG tablet  Commonly known as: SYNTHROID  Take 1 tablet (88 mcg total) by mouth before breakfast.  Start taking on: July 2, 2022     metoprolol succinate 25 MG 24 hr tablet  Commonly known as: TOPROL-XL     montelukast 10 mg tablet  Commonly known as: SINGULAIR     nitroGLYCERIN 0.4 MG SL tablet  Commonly known as: NITROSTAT     rosuvastatin 20 MG tablet  Commonly known as: CRESTOR        STOP taking these medications    doxycycline 100 MG Cap  Commonly known as: VIBRAMYCIN           Where to Get Your Medications      You can get these medications from any pharmacy    Bring a paper prescription for each of these medications  · albuterol-ipratropium 2.5 mg-0.5 mg/3 mL nebulizer solution  · aspirin 81 MG EC tablet  · cefdinir 300 MG capsule  · dextromethorphan-guaiFENesin  mg/5 ml  mg/5 mL liquid  · levothyroxine 88 MCG tablet  · MUCINEX 1,200 mg Ta12  · predniSONE 20 MG tablet         Discharge Information:   Diet:  Cardiac    Physical Activity:  AAT    Follow-Up  Appointments:  See orders    Time spent 30 mins       David Pyle

## 2022-07-05 ENCOUNTER — PATIENT OUTREACH (OUTPATIENT)
Dept: ADMINISTRATIVE | Facility: CLINIC | Age: 77
End: 2022-07-05
Payer: MEDICARE

## 2022-07-05 NOTE — PROGRESS NOTES
C3 nurse attempted to contact Marcela Braga for a TCC post hospital discharge follow up call. No answer, left VM, CB# provided.  The patient does not have a scheduled HOSFU appointment that I can locate.

## 2022-07-07 NOTE — PROGRESS NOTES
C3 nurse spoke with Marcela Braga   for a TCC post hospital discharge follow up call. The patient does not have a scheduled HOSFU appointment with Minna Reynolds MD   within 7 days post hospital discharge date 07/05/2022. C3 nurse was unable to schedule HOSFU appointment in [facility].

## 2022-10-04 ENCOUNTER — CLINICAL SUPPORT (OUTPATIENT)
Dept: RESPIRATORY THERAPY | Facility: HOSPITAL | Age: 77
End: 2022-10-04
Attending: FAMILY MEDICINE
Payer: MEDICARE

## 2022-10-04 VITALS — OXYGEN SATURATION: 96 % | HEART RATE: 85 BPM | RESPIRATION RATE: 20 BRPM

## 2022-10-04 DIAGNOSIS — J45.901 MODERATE ASTHMA WITH EXACERBATION: Primary | ICD-10-CM

## 2022-10-04 PROCEDURE — 94760 N-INVAS EAR/PLS OXIMETRY 1: CPT

## 2022-10-04 PROCEDURE — 94640 AIRWAY INHALATION TREATMENT: CPT

## 2022-10-04 RX ORDER — ALBUTEROL SULFATE 0.83 MG/ML
SOLUTION RESPIRATORY (INHALATION)
Status: DISPENSED
Start: 2022-10-04 | End: 2022-10-04

## 2022-10-04 RX ORDER — ALBUTEROL SULFATE 0.83 MG/ML
2.5 SOLUTION RESPIRATORY (INHALATION) ONCE
Status: COMPLETED | OUTPATIENT
Start: 2022-10-04 | End: 2022-10-04

## 2022-10-04 RX ADMIN — ALBUTEROL SULFATE 2.5 MG: 0.83 SOLUTION RESPIRATORY (INHALATION) at 11:10

## 2022-10-06 ENCOUNTER — HOSPITAL ENCOUNTER (OUTPATIENT)
Dept: RADIOLOGY | Facility: HOSPITAL | Age: 77
Discharge: HOME | End: 2022-10-06
Attending: FAMILY MEDICINE
Payer: MEDICARE

## 2022-10-06 DIAGNOSIS — Z12.31 ENCOUNTER FOR SCREENING MAMMOGRAM FOR BREAST CANCER: ICD-10-CM

## 2022-11-16 ENCOUNTER — HOSPITAL ENCOUNTER (OUTPATIENT)
Dept: RADIOLOGY | Facility: HOSPITAL | Age: 77
Discharge: HOME | End: 2022-11-16
Attending: FAMILY MEDICINE
Payer: MEDICARE

## 2022-11-16 PROCEDURE — 77063 BREAST TOMOSYNTHESIS BI: CPT | Mod: TC

## 2022-11-16 PROCEDURE — 77063 BREAST TOMOSYNTHESIS BI: CPT | Mod: 26,,, | Performed by: STUDENT IN AN ORGANIZED HEALTH CARE EDUCATION/TRAINING PROGRAM

## 2022-11-16 PROCEDURE — 77067 SCR MAMMO BI INCL CAD: CPT | Mod: 26,,, | Performed by: STUDENT IN AN ORGANIZED HEALTH CARE EDUCATION/TRAINING PROGRAM

## 2022-11-16 PROCEDURE — 77067 MAMMO DIGITAL SCREENING BILAT WITH TOMO: ICD-10-PCS | Mod: 26,,, | Performed by: STUDENT IN AN ORGANIZED HEALTH CARE EDUCATION/TRAINING PROGRAM

## 2022-11-16 PROCEDURE — 77063 MAMMO DIGITAL SCREENING BILAT WITH TOMO: ICD-10-PCS | Mod: 26,,, | Performed by: STUDENT IN AN ORGANIZED HEALTH CARE EDUCATION/TRAINING PROGRAM

## 2022-12-13 ENCOUNTER — DOCUMENTATION ONLY (OUTPATIENT)
Dept: INTERNAL MEDICINE | Facility: CLINIC | Age: 77
End: 2022-12-13
Payer: MEDICARE

## 2023-01-31 ENCOUNTER — HOSPITAL ENCOUNTER (OUTPATIENT)
Dept: RADIOLOGY | Facility: HOSPITAL | Age: 78
Discharge: HOME | End: 2023-01-31
Payer: MEDICARE

## 2023-01-31 DIAGNOSIS — R91.1 SOLITARY PULMONARY NODULE: ICD-10-CM

## 2023-01-31 PROCEDURE — 71250 CT THORAX DX C-: CPT | Mod: TC

## 2023-03-31 DIAGNOSIS — Z78.0 ASYMPTOMATIC MENOPAUSAL STATE: Primary | ICD-10-CM

## 2023-10-24 DIAGNOSIS — Z78.0 ASYMPTOMATIC POSTMENOPAUSAL STATUS: Primary | ICD-10-CM

## 2023-10-24 DIAGNOSIS — Z12.31 ENCOUNTER FOR SCREENING MAMMOGRAM FOR MALIGNANT NEOPLASM OF BREAST: ICD-10-CM

## 2023-12-12 ENCOUNTER — HOSPITAL ENCOUNTER (OUTPATIENT)
Dept: RADIOLOGY | Facility: HOSPITAL | Age: 78
Discharge: HOME | End: 2023-12-12
Attending: FAMILY MEDICINE
Payer: MEDICARE

## 2023-12-12 DIAGNOSIS — Z78.0 ASYMPTOMATIC POSTMENOPAUSAL STATUS: ICD-10-CM

## 2023-12-12 DIAGNOSIS — Z12.31 ENCOUNTER FOR SCREENING MAMMOGRAM FOR MALIGNANT NEOPLASM OF BREAST: ICD-10-CM

## 2023-12-12 PROCEDURE — 77067 SCR MAMMO BI INCL CAD: CPT | Mod: TC

## 2023-12-12 PROCEDURE — 77067 MAMMO DIGITAL SCREENING BILAT WITH TOMO: ICD-10-PCS | Mod: 26,,, | Performed by: STUDENT IN AN ORGANIZED HEALTH CARE EDUCATION/TRAINING PROGRAM

## 2023-12-12 PROCEDURE — 77063 MAMMO DIGITAL SCREENING BILAT WITH TOMO: ICD-10-PCS | Mod: 26,,, | Performed by: STUDENT IN AN ORGANIZED HEALTH CARE EDUCATION/TRAINING PROGRAM

## 2023-12-12 PROCEDURE — 77063 BREAST TOMOSYNTHESIS BI: CPT | Mod: 26,,, | Performed by: STUDENT IN AN ORGANIZED HEALTH CARE EDUCATION/TRAINING PROGRAM

## 2023-12-12 PROCEDURE — 77080 DXA BONE DENSITY AXIAL: CPT | Mod: TC

## 2023-12-12 PROCEDURE — 77067 SCR MAMMO BI INCL CAD: CPT | Mod: 26,,, | Performed by: STUDENT IN AN ORGANIZED HEALTH CARE EDUCATION/TRAINING PROGRAM

## 2024-04-24 DIAGNOSIS — Z12.31 ENCOUNTER FOR SCREENING MAMMOGRAM FOR BREAST CANCER: Primary | ICD-10-CM

## 2024-05-09 ENCOUNTER — CLINICAL SUPPORT (OUTPATIENT)
Dept: RESPIRATORY THERAPY | Facility: HOSPITAL | Age: 79
End: 2024-05-09
Attending: FAMILY MEDICINE
Payer: MEDICARE

## 2024-05-09 VITALS — OXYGEN SATURATION: 97 % | HEART RATE: 78 BPM

## 2024-05-09 DIAGNOSIS — R06.02 SHORTNESS OF BREATH: Primary | ICD-10-CM

## 2024-05-09 PROCEDURE — 94640 AIRWAY INHALATION TREATMENT: CPT | Mod: 59

## 2024-05-09 PROCEDURE — 94010 BREATHING CAPACITY TEST: CPT

## 2024-05-09 PROCEDURE — 94761 N-INVAS EAR/PLS OXIMETRY MLT: CPT

## 2024-05-09 RX ORDER — ALBUTEROL SULFATE 0.83 MG/ML
SOLUTION RESPIRATORY (INHALATION)
Status: COMPLETED
Start: 2024-05-09 | End: 2024-05-09

## 2024-05-09 RX ADMIN — ALBUTEROL SULFATE 2.5 MG: 0.83 SOLUTION RESPIRATORY (INHALATION) at 10:05

## 2024-05-09 NOTE — PROGRESS NOTES
Spirometry done pre and post tx.  HR 78.  SPO2 97%.  SVN tx given with 2.5 mg of albuterol and ns.  BBS were clear.  No adverse reactions noted..  Results sent to pulmonology group [facility] for interpretation      MADAY Hoang RRT, RPFT

## 2024-11-07 ENCOUNTER — HOSPITAL ENCOUNTER (OUTPATIENT)
Dept: RADIOLOGY | Facility: HOSPITAL | Age: 79
Discharge: HOME | End: 2024-11-07
Attending: FAMILY MEDICINE
Payer: MEDICARE

## 2024-11-07 DIAGNOSIS — R06.02 SHORTNESS OF BREATH: ICD-10-CM

## 2024-11-07 PROCEDURE — 71046 X-RAY EXAM CHEST 2 VIEWS: CPT | Mod: TC
